# Patient Record
Sex: MALE | Race: WHITE | Employment: FULL TIME | URBAN - METROPOLITAN AREA
[De-identification: names, ages, dates, MRNs, and addresses within clinical notes are randomized per-mention and may not be internally consistent; named-entity substitution may affect disease eponyms.]

---

## 2017-03-27 ENCOUNTER — OFFICE VISIT (OUTPATIENT)
Dept: INTERNAL MEDICINE | Age: 27
End: 2017-03-27
Payer: COMMERCIAL

## 2017-03-27 VITALS
RESPIRATION RATE: 13 BRPM | WEIGHT: 291.2 LBS | OXYGEN SATURATION: 100 % | BODY MASS INDEX: 41.69 KG/M2 | HEART RATE: 77 BPM | SYSTOLIC BLOOD PRESSURE: 128 MMHG | DIASTOLIC BLOOD PRESSURE: 85 MMHG | HEIGHT: 70 IN

## 2017-03-27 DIAGNOSIS — J45.20 MILD INTERMITTENT ASTHMA WITHOUT COMPLICATION: ICD-10-CM

## 2017-03-27 DIAGNOSIS — I10 BENIGN ESSENTIAL HTN: Primary | ICD-10-CM

## 2017-03-27 PROCEDURE — 99214 OFFICE O/P EST MOD 30 MIN: CPT | Performed by: INTERNAL MEDICINE

## 2017-03-27 RX ORDER — LOSARTAN POTASSIUM AND HYDROCHLOROTHIAZIDE 25; 100 MG/1; MG/1
1 TABLET ORAL DAILY
Qty: 90 TABLET | Refills: 3 | Status: SHIPPED | OUTPATIENT
Start: 2017-03-27 | End: 2017-09-27 | Stop reason: SDUPTHER

## 2017-03-27 RX ORDER — ALBUTEROL SULFATE 90 UG/1
2 AEROSOL, METERED RESPIRATORY (INHALATION) EVERY 6 HOURS PRN
Qty: 1 INHALER | Refills: 3 | Status: SHIPPED | OUTPATIENT
Start: 2017-03-27

## 2017-03-27 ASSESSMENT — PATIENT HEALTH QUESTIONNAIRE - PHQ9
SUM OF ALL RESPONSES TO PHQ9 QUESTIONS 1 & 2: 0
SUM OF ALL RESPONSES TO PHQ QUESTIONS 1-9: 0
2. FEELING DOWN, DEPRESSED OR HOPELESS: 0
1. LITTLE INTEREST OR PLEASURE IN DOING THINGS: 0

## 2017-03-27 ASSESSMENT — ENCOUNTER SYMPTOMS: DIFFICULTY BREATHING: 1

## 2017-09-27 ENCOUNTER — OFFICE VISIT (OUTPATIENT)
Dept: INTERNAL MEDICINE | Age: 27
End: 2017-09-27
Payer: COMMERCIAL

## 2017-09-27 VITALS
SYSTOLIC BLOOD PRESSURE: 142 MMHG | DIASTOLIC BLOOD PRESSURE: 93 MMHG | HEART RATE: 110 BPM | OXYGEN SATURATION: 100 % | HEIGHT: 70 IN | RESPIRATION RATE: 12 BRPM | WEIGHT: 284 LBS | BODY MASS INDEX: 40.66 KG/M2

## 2017-09-27 DIAGNOSIS — J45.20 MILD INTERMITTENT ASTHMA WITHOUT COMPLICATION: ICD-10-CM

## 2017-09-27 DIAGNOSIS — I10 BENIGN ESSENTIAL HTN: Primary | ICD-10-CM

## 2017-09-27 DIAGNOSIS — Z00.00 WELL ADULT EXAM: ICD-10-CM

## 2017-09-27 PROCEDURE — 99214 OFFICE O/P EST MOD 30 MIN: CPT | Performed by: INTERNAL MEDICINE

## 2017-09-27 RX ORDER — LOSARTAN POTASSIUM AND HYDROCHLOROTHIAZIDE 25; 100 MG/1; MG/1
1 TABLET ORAL DAILY
Qty: 90 TABLET | Refills: 3 | Status: SHIPPED | OUTPATIENT
Start: 2017-09-27 | End: 2018-04-12 | Stop reason: SDUPTHER

## 2017-09-27 ASSESSMENT — ENCOUNTER SYMPTOMS
EYES NEGATIVE: 1
GASTROINTESTINAL NEGATIVE: 1
RESPIRATORY NEGATIVE: 1
DIFFICULTY BREATHING: 1

## 2018-04-12 ENCOUNTER — OFFICE VISIT (OUTPATIENT)
Dept: INTERNAL MEDICINE | Age: 28
End: 2018-04-12
Payer: COMMERCIAL

## 2018-04-12 VITALS
SYSTOLIC BLOOD PRESSURE: 132 MMHG | HEIGHT: 70 IN | DIASTOLIC BLOOD PRESSURE: 86 MMHG | OXYGEN SATURATION: 100 % | RESPIRATION RATE: 12 BRPM | BODY MASS INDEX: 39.11 KG/M2 | WEIGHT: 273.2 LBS | HEART RATE: 100 BPM

## 2018-04-12 DIAGNOSIS — I10 BENIGN ESSENTIAL HTN: Primary | ICD-10-CM

## 2018-04-12 DIAGNOSIS — G44.209 TENSION HEADACHE: ICD-10-CM

## 2018-04-12 PROCEDURE — 99214 OFFICE O/P EST MOD 30 MIN: CPT | Performed by: INTERNAL MEDICINE

## 2018-04-12 RX ORDER — LOSARTAN POTASSIUM AND HYDROCHLOROTHIAZIDE 25; 100 MG/1; MG/1
1 TABLET ORAL DAILY
Qty: 30 TABLET | Refills: 11 | Status: SHIPPED | OUTPATIENT
Start: 2018-04-12 | End: 2018-10-12 | Stop reason: SDUPTHER

## 2018-04-12 ASSESSMENT — PATIENT HEALTH QUESTIONNAIRE - PHQ9
SUM OF ALL RESPONSES TO PHQ9 QUESTIONS 1 & 2: 0
2. FEELING DOWN, DEPRESSED OR HOPELESS: 0
SUM OF ALL RESPONSES TO PHQ QUESTIONS 1-9: 0
1. LITTLE INTEREST OR PLEASURE IN DOING THINGS: 0

## 2018-04-12 ASSESSMENT — ENCOUNTER SYMPTOMS
RESPIRATORY NEGATIVE: 1
GASTROINTESTINAL NEGATIVE: 1
EYES NEGATIVE: 1

## 2018-10-12 ENCOUNTER — OFFICE VISIT (OUTPATIENT)
Dept: INTERNAL MEDICINE | Age: 28
End: 2018-10-12
Payer: COMMERCIAL

## 2018-10-12 VITALS
WEIGHT: 260 LBS | BODY MASS INDEX: 37.31 KG/M2 | DIASTOLIC BLOOD PRESSURE: 85 MMHG | OXYGEN SATURATION: 98 % | SYSTOLIC BLOOD PRESSURE: 137 MMHG | HEART RATE: 93 BPM

## 2018-10-12 DIAGNOSIS — J45.20 MILD INTERMITTENT ASTHMA WITHOUT COMPLICATION: ICD-10-CM

## 2018-10-12 DIAGNOSIS — Z00.00 WELL ADULT EXAM: ICD-10-CM

## 2018-10-12 DIAGNOSIS — I10 BENIGN ESSENTIAL HTN: Primary | ICD-10-CM

## 2018-10-12 PROCEDURE — 99214 OFFICE O/P EST MOD 30 MIN: CPT | Performed by: INTERNAL MEDICINE

## 2018-10-12 RX ORDER — LOSARTAN POTASSIUM AND HYDROCHLOROTHIAZIDE 25; 100 MG/1; MG/1
1 TABLET ORAL DAILY
Qty: 90 TABLET | Refills: 3 | Status: SHIPPED | OUTPATIENT
Start: 2018-10-12 | End: 2019-08-22 | Stop reason: SDUPTHER

## 2018-10-12 ASSESSMENT — ENCOUNTER SYMPTOMS
GASTROINTESTINAL NEGATIVE: 1
ALLERGIC/IMMUNOLOGIC NEGATIVE: 1
COUGH: 1

## 2019-04-15 ENCOUNTER — OFFICE VISIT (OUTPATIENT)
Dept: PRIMARY CARE CLINIC | Age: 29
End: 2019-04-15
Payer: COMMERCIAL

## 2019-04-15 VITALS
TEMPERATURE: 98.3 F | HEIGHT: 70 IN | WEIGHT: 273 LBS | OXYGEN SATURATION: 99 % | DIASTOLIC BLOOD PRESSURE: 76 MMHG | BODY MASS INDEX: 39.08 KG/M2 | SYSTOLIC BLOOD PRESSURE: 130 MMHG | HEART RATE: 76 BPM | RESPIRATION RATE: 17 BRPM

## 2019-04-15 DIAGNOSIS — I10 ESSENTIAL HYPERTENSION: Primary | ICD-10-CM

## 2019-04-15 PROCEDURE — 99214 OFFICE O/P EST MOD 30 MIN: CPT | Performed by: INTERNAL MEDICINE

## 2019-04-15 ASSESSMENT — PATIENT HEALTH QUESTIONNAIRE - PHQ9
SUM OF ALL RESPONSES TO PHQ QUESTIONS 1-9: 0
1. LITTLE INTEREST OR PLEASURE IN DOING THINGS: 0
2. FEELING DOWN, DEPRESSED OR HOPELESS: 0
SUM OF ALL RESPONSES TO PHQ QUESTIONS 1-9: 0
SUM OF ALL RESPONSES TO PHQ9 QUESTIONS 1 & 2: 0

## 2019-04-15 NOTE — PROGRESS NOTES
Visit Information    Have you changed or started any medications since your last visit including any over-the-counter medicines, vitamins, or herbal medicines? no   Have you stopped taking any of your medications? Is so, why? -  no  Are you having any side effects from any of your medications? - no    Have you seen any other physician or provider since your last visit?  no   Have you had any other diagnostic tests since your last visit?  no   Have you been seen in the emergency room and/or had an admission in a hospital since we last saw you?  no   Have you had your routine dental cleaning in the past 6 months?  no     Do you have an active MyChart account? If no, what is the barrier?   No    Patient Care Team:  Merline Mound, MD as PCP - General (Internal Medicine)  Merline Mound, MD as PCP - [facility] Attributed Provider    Medical History Review  Past Medical, Family, and Social History reviewed and does not contribute to the patient presenting condition    Health Maintenance   Topic Date Due    Pneumococcal 0-64 years Vaccine (1 of 1 - PPSV23) 02/25/1995    Varicella Vaccine (1 of 2 - 13+ 2-dose series) 02/25/2002    DTaP/Tdap/Td vaccine (1 - Tdap) 02/25/2008    Potassium monitoring  02/13/2017    Creatinine monitoring  02/13/2017    Flu vaccine (Season Ended) 09/01/2019    HIV screen  Addressed
There are no known risk factors for coronary artery disease. Past treatments include angiotensin blockers and diuretics. The current treatment provides significant improvement. There are no compliance problems. Has not used inhaler in a couple years. ROS:     Review of Systems   All other systems reviewed and are negative. Objective:     Physical Exam   Constitutional: He is oriented to person, place, and time. He appears well-developed and well-nourished. HENT:   Head: Normocephalic and atraumatic. Neck: Neck supple. Cardiovascular: Normal rate and regular rhythm. Pulmonary/Chest: Effort normal and breath sounds normal.   Abdominal: Soft. Musculoskeletal: He exhibits no edema. Neurological: He is alert and oriented to person, place, and time. Skin: Skin is warm and dry. Psychiatric: He has a normal mood and affect.  His behavior is normal.

## 2019-08-22 ENCOUNTER — OFFICE VISIT (OUTPATIENT)
Dept: FAMILY MEDICINE CLINIC | Age: 29
End: 2019-08-22
Payer: COMMERCIAL

## 2019-08-22 VITALS
WEIGHT: 270 LBS | OXYGEN SATURATION: 100 % | SYSTOLIC BLOOD PRESSURE: 128 MMHG | HEIGHT: 71 IN | BODY MASS INDEX: 37.8 KG/M2 | HEART RATE: 96 BPM | DIASTOLIC BLOOD PRESSURE: 72 MMHG

## 2019-08-22 DIAGNOSIS — Z71.82 EXERCISE COUNSELING: ICD-10-CM

## 2019-08-22 DIAGNOSIS — Z00.00 WELL ADULT EXAM: Primary | ICD-10-CM

## 2019-08-22 DIAGNOSIS — Z13.89 MULTIPHASIC SCREENING: ICD-10-CM

## 2019-08-22 DIAGNOSIS — J45.20 MILD INTERMITTENT ASTHMA WITHOUT COMPLICATION: ICD-10-CM

## 2019-08-22 DIAGNOSIS — I10 HTN, GOAL BELOW 130/80: ICD-10-CM

## 2019-08-22 DIAGNOSIS — G47.19 EXCESSIVE DAYTIME SLEEPINESS: ICD-10-CM

## 2019-08-22 DIAGNOSIS — Z71.3 DIETARY COUNSELING: ICD-10-CM

## 2019-08-22 DIAGNOSIS — Z23 NEED FOR VACCINATION: ICD-10-CM

## 2019-08-22 DIAGNOSIS — K21.9 GASTROESOPHAGEAL REFLUX DISEASE WITHOUT ESOPHAGITIS: ICD-10-CM

## 2019-08-22 PROCEDURE — 99385 PREV VISIT NEW AGE 18-39: CPT | Performed by: FAMILY MEDICINE

## 2019-08-22 PROCEDURE — G8417 CALC BMI ABV UP PARAM F/U: HCPCS | Performed by: FAMILY MEDICINE

## 2019-08-22 PROCEDURE — 90471 IMMUNIZATION ADMIN: CPT | Performed by: FAMILY MEDICINE

## 2019-08-22 PROCEDURE — 99203 OFFICE O/P NEW LOW 30 MIN: CPT | Performed by: FAMILY MEDICINE

## 2019-08-22 PROCEDURE — 90715 TDAP VACCINE 7 YRS/> IM: CPT | Performed by: FAMILY MEDICINE

## 2019-08-22 RX ORDER — RANITIDINE 150 MG/1
150 TABLET ORAL 2 TIMES DAILY
Qty: 60 TABLET | Refills: 3 | Status: SHIPPED | OUTPATIENT
Start: 2019-08-22 | End: 2020-09-10

## 2019-08-22 RX ORDER — LOSARTAN POTASSIUM AND HYDROCHLOROTHIAZIDE 25; 100 MG/1; MG/1
1 TABLET ORAL DAILY
Qty: 90 TABLET | Refills: 1 | Status: SHIPPED | OUTPATIENT
Start: 2019-08-22 | End: 2019-10-22 | Stop reason: SDUPTHER

## 2019-08-22 NOTE — PATIENT INSTRUCTIONS
active, even a little bit every day. Making small changes over time can add up to a lot. Make a plan for change. Many people have found that naming their reasons for change and staying focused on their plan can make a big difference. Work with your doctor to create a plan that is right for you. · Ask yourself: Jad Gray are my personal, most powerful reasons for wanting this change? What will my life look like when I've made the change? \"  · Set your long-term goal. Make it specific, such as \"I will lose x pounds. \"  · Break your long-term goal into smaller, short-term goals. Make these small steps specific and within your reach, things you know you can do. These steps are what keep you going from day to day. Talk with your doctor about other weight-loss options. If you have a BMI in a certain range and have not been able to lose weight with diet and exercise, medicine or surgery may be an option for you. Before your doctor will prescribe medicines or surgery, he or she will probably want you to be more active and follow your healthy eating plan for a period of time. These habits are key lifelong changes for managing your weight, with or without other medical treatment. And these changes can help you avoid weight-related health problems. How can you stay on your plan for change? Be ready. Choose to start during a time when there are few events that might trigger slip-ups, like holidays, social events, and high-stress periods. Decide on your first few steps. Most people have more success when they make small changes, one step at a time. For example, you might switch a daily candy bar to a piece of fruit, walk 10 minutes more, or add more vegetables to a meal.  Line up your support people. Make sure you're not going to be alone as you make this change. Connect with people who understand how important it is to you. Ask family members and friends for help in keeping with your plan.  And think about who could make it

## 2019-08-22 NOTE — PROGRESS NOTES
+EHSAN  Genitourinary: No change in urinary frequency or dysuria. Skin: No rashes or skin lesions. Neurological: No weakness. No headaches. Last Filed Vitals:  /72 (Site: Left Upper Arm, Position: Sitting, Cuff Size: Large Adult)   Pulse 96   Ht 5' 11\" (1.803 m)   Wt 270 lb (122.5 kg)   SpO2 100%   BMI 37.66 kg/m²      Physical Examination:     GENERAL APPEARANCE: in no acute distress, well developed, well nourished. HEAD: normocephalic, atraumatic. EYES: extraocular movement intact (EOMI), pupils equal, round, reactive to light and accommodation. EARS: normal, tympanic membrane intact, clear, auditory canal clear. NOSE: nares patent, no erythema, sinuses nontender bilaterally, no rhinorrhea. ORAL CAVITY: mucosa moist, no lesions. THROAT: clear, no mass, no exudate. NECK/THYROID: neck supple, full range of motion, no thyromegaly. HEART: no murmurs, regular rate and rhythm, S1, S2 normal.   LUNGS: clear to auscultation bilaterally, no wheezes, rales, rhonchi.    ABDOMEN: normal, bowel sounds present, soft, nontender, nondistended, no rebound guarding or rigidity    Recent Labs/ In Office Testing/ Radiograph review:     Hospital Outpatient Visit on 02/13/2016   Component Date Value Ref Range Status    Glucose 02/13/2016 91  70 - 99 mg/dL Final    BUN 02/13/2016 13  6 - 20 mg/dL Final    CREATININE 02/13/2016 0.80  0.70 - 1.20 mg/dL Final    Bun/Cre Ratio 02/13/2016 NOT REPORTED  9 - 20 Final    Calcium 02/13/2016 9.2  8.6 - 10.4 mg/dL Final    Sodium 02/13/2016 141  135 - 144 mmol/L Final    Potassium 02/13/2016 4.1  3.7 - 5.3 mmol/L Final    Chloride 02/13/2016 101  98 - 107 mmol/L Final    CO2 02/13/2016 25  20 - 31 mmol/L Final    Anion Gap 02/13/2016 15  9 - 17 mmol/L Final    GFR Non- 02/13/2016 >60  >60 mL/min Final    GFR  02/13/2016 >60  >60 mL/min Final    GFR Comment 02/13/2016        Final    Comment: Average GFR for 20-29 years The patient was evaluated and treated today based on the osteopathic principle that each person is a unit of body, mind, and spirit, the body is capable of self-regulation, self-healing, and health maintenance and that structure and function are reciprocally interrelated. Follow-up:   Return in about 6 months (around 2/22/2020) for htn/gerd 20 min appt. Teresa Menezes D.O.    BMI was elevated today, and weight loss plan recommended is : conventional weight loss.

## 2019-11-25 DIAGNOSIS — I10 HTN, GOAL BELOW 130/80: ICD-10-CM

## 2019-11-25 RX ORDER — LOSARTAN POTASSIUM AND HYDROCHLOROTHIAZIDE 25; 100 MG/1; MG/1
1 TABLET ORAL DAILY
Qty: 30 TABLET | Refills: 2 | Status: SHIPPED | OUTPATIENT
Start: 2019-11-25 | End: 2019-11-25

## 2019-11-25 RX ORDER — LOSARTAN POTASSIUM 100 MG/1
100 TABLET ORAL DAILY
Qty: 30 TABLET | Refills: 2 | Status: SHIPPED | OUTPATIENT
Start: 2019-11-25 | End: 2020-01-21

## 2019-11-25 RX ORDER — HYDROCHLOROTHIAZIDE 25 MG/1
25 TABLET ORAL EVERY MORNING
Qty: 30 TABLET | Refills: 2 | Status: SHIPPED | OUTPATIENT
Start: 2019-11-25 | End: 2020-01-21

## 2020-01-21 RX ORDER — HYDROCHLOROTHIAZIDE 25 MG/1
TABLET ORAL
Qty: 30 TABLET | Refills: 3 | Status: SHIPPED | OUTPATIENT
Start: 2020-01-21 | End: 2020-06-25 | Stop reason: SDUPTHER

## 2020-01-21 RX ORDER — LOSARTAN POTASSIUM 100 MG/1
TABLET ORAL
Qty: 30 TABLET | Refills: 3 | Status: SHIPPED | OUTPATIENT
Start: 2020-01-21 | End: 2020-06-25 | Stop reason: SDUPTHER

## 2020-01-21 NOTE — TELEPHONE ENCOUNTER
Last visit: 8/22/19  Last Med refill:   Does patient have enough medication for 72 hours:     Next Visit Date:  Future Appointments   Date Time Provider Emerald Ferrell   2/24/2020  7:00 AM Ric Castillo  [address] [facility] Maintenance   Topic Date Due    Varicella Vaccine (1 of 2 - 2-dose childhood series) 02/25/1990    Pneumococcal 0-64 years Vaccine (1 of 1 - PPSV23) 02/25/1995    Potassium monitoring  02/13/2017    Creatinine monitoring  02/13/2017    Flu vaccine (1) 09/01/2019    DTaP/Tdap/Td vaccine (2 - Td) 08/22/2029    HIV screen  Addressed       No results found for: LABA1C          ( goal A1C is < 7)   No results found for: LABMICR  LDL Cholesterol (mg/dL)   Date Value   02/13/2016 143 (H)       (goal LDL is <100)   BUN (mg/dL)   Date Value   02/13/2016 13     BP Readings from Last 3 Encounters:   08/22/19 128/72   04/15/19 130/76   10/12/18 137/85          (goal 120/80)    All Future Testing planned in [facility]  Lab Frequency Next Occurrence   ALT Once 08/22/2019   AST Once 08/22/2019   CBC Auto Differential Once 08/22/2019   Hemoglobin A1C Once 08/22/2019   HIV Screen Once 08/22/2019   Lipid Panel Once 08/22/2019   Magnesium Once 08/22/2019   Renal Function Panel Once 08/22/2019   TSH with Reflex Once 08/22/2019   Vitamin D 25 Hydroxy Once 08/22/2019               Patient Active Problem List:     Elevated blood pressure reading     Benign essential HTN     Mild intermittent asthma without complication     Tension headache

## 2020-03-06 ENCOUNTER — OFFICE VISIT (OUTPATIENT)
Dept: FAMILY MEDICINE CLINIC | Age: 30
End: 2020-03-06
Payer: COMMERCIAL

## 2020-03-06 VITALS
HEART RATE: 94 BPM | SYSTOLIC BLOOD PRESSURE: 122 MMHG | BODY MASS INDEX: 38.49 KG/M2 | OXYGEN SATURATION: 99 % | WEIGHT: 276 LBS | DIASTOLIC BLOOD PRESSURE: 78 MMHG

## 2020-03-06 PROCEDURE — G8417 CALC BMI ABV UP PARAM F/U: HCPCS | Performed by: FAMILY MEDICINE

## 2020-03-06 PROCEDURE — 99214 OFFICE O/P EST MOD 30 MIN: CPT | Performed by: FAMILY MEDICINE

## 2020-03-06 ASSESSMENT — PATIENT HEALTH QUESTIONNAIRE - PHQ9
1. LITTLE INTEREST OR PLEASURE IN DOING THINGS: 0
SUM OF ALL RESPONSES TO PHQ9 QUESTIONS 1 & 2: 0
2. FEELING DOWN, DEPRESSED OR HOPELESS: 0
SUM OF ALL RESPONSES TO PHQ QUESTIONS 1-9: 0
SUM OF ALL RESPONSES TO PHQ QUESTIONS 1-9: 0

## 2020-03-06 NOTE — PATIENT INSTRUCTIONS
Learning About Obesity  What is obesity? Obesity means having a body mass index (BMI) of 30 or above. BMI is a number that is calculated from your weight and your height. How do you know if your weight is in the obesity range? To know if your weight is in the obesity range, your doctor looks at your body mass index (BMI) and waist size. BMI is a number that is calculated from your weight and your height. To figure out your BMI for yourself, you can use an online tool, such as http://www.cai.com/ on the [employer]. If your BMI is 30.0 or higher, it falls within the obesity range. Keep in mind that BMI and waist size are only guides. They are not tools to determine your ideal body weight. What causes obesity? When you take in more calories than you burn off, you gain weight. How you eat, how active you are, and other things affect how your body uses calories and whether you gain weight. If you have family members who have too much body fat, you may have inherited a tendency to gain weight. And your family also helps form your eating and lifestyle habits, which can lead to obesity. Also, our busy lives make it harder to plan and cook healthy meals. For many of us, it's easier to reach for prepared foods, go out to eat, or go to the drive-through. But these foods are often high in saturated fat and calories. Portions are often too large. What can you do to reach a healthy weight? Focus on health, not diets. Diets are hard to stay on and don't work in the long run. It is very hard to stay with a diet that includes lots of big changes in your eating habits. Instead of a diet, focus on lifestyle changes that will improve your health and achieve the right balance of energy and calories. To lose weight, you need to burn more calories than you take in.  You can do it by eating healthy foods in reasonable amounts and becoming more active, even a little bit every day. Making small changes over time can add up to a lot. Make a plan for change. Many people have found that naming their reasons for change and staying focused on their plan can make a big difference. Work with your doctor to create a plan that is right for you. · Ask yourself: Gabriela Dee are my personal, most powerful reasons for wanting this change? What will my life look like when I've made the change? \"  · Set your long-term goal. Make it specific, such as \"I will lose x pounds. \"  · Break your long-term goal into smaller, short-term goals. Make these small steps specific and within your reach, things you know you can do. These steps are what keep you going from day to day. Talk with your doctor about other weight-loss options. If you have a BMI in a certain range and have not been able to lose weight with diet and exercise, medicine or surgery may be an option for you. Before your doctor will prescribe medicines or surgery, he or she will probably want you to be more active and follow your healthy eating plan for a period of time. These habits are key lifelong changes for managing your weight, with or without other medical treatment. And these changes can help you avoid weight-related health problems. How can you stay on your plan for change? Be ready. Choose to start during a time when there are few events that might trigger slip-ups, like holidays, social events, and high-stress periods. Decide on your first few steps. Most people have more success when they make small changes, one step at a time. For example, you might switch a daily candy bar to a piece of fruit, walk 10 minutes more, or add more vegetables to a meal.  Line up your support people. Make sure you're not going to be alone as you make this change. Connect with people who understand how important it is to you. Ask family members and friends for help in keeping with your plan.  And think about who could make it harder for you, and how to handle them. Try tracking. People who keep track of what they eat, feel, and do are better at losing weight. Try writing down things like:  · What and how much you eat. · How you feel before and after each meal.  · Details about each meal (like eating out or at home, eating alone, or with friends or family). · What you do to be active. Look and plan. As you track, look for patterns that you may want to change. Take note of:  · When you eat and whether you skip meals. · How often you eat out. · How many fruits and vegetables you eat. · When you eat beyond feeling full. · When and why you eat for reasons other than being hungry. When you stray from your plan, don't get upset. Figure out what made you slip up and how you can fix it. Can you take medicines or have surgery to lose weight? If you have a BMI in a certain range and have not been able to lose weight with diet and exercise, medicine or surgery may be an option for you. If you have a BMI of at least 30.0 (or a BMI of at least 27.0 and another health problem related to your weight), ask your doctor about weight-loss medicines. They work by making you feel less hungry, making you feel full more quickly, or changing how you digest fat. Medicines are used along with diet changes and more physical activity to help you make lasting changes. If you have a BMI of 40.0 or more (or a BMI of 35.0 or more and another health problem related to your weight), your doctor may talk with you about surgery. Weight-loss surgery has risks, and you will need to work with your doctor to compare the risk of having obesity with the risks of surgery. With any option you choose, you will still need to eat a healthy diet and get regular exercise. Follow-up care is a key part of your treatment and safety. Be sure to make and go to all appointments, and call your doctor if you are having problems.  It's also a good idea to know your test results and

## 2020-03-06 NOTE — PROGRESS NOTES
Progress Note    Josh Cadena is a 31 y.o.  male who presents today alone for evaluation of   Chief Complaint   Patient presents with    Hypertension    Hyperlipidemia    Hyperglycemia    Gastroesophageal Reflux           HPI:   Patient is here for follow up on HTN, GERD, dyslipidemia, and hyperglycemia. Today, patient denies cp/sob/le edema/dizziness/lightheadedness/blurry va/ha. Patient denies excessive OTC stimulants/sodium. Patient denies specific diet. Patient denies regular aerobic activity. Patient denies abd pain/v/n/d. Patient denies blood in stool. Patient denies dark tarry stools. Patient denies wt loss or night sweats. Patient denies recent flare of GERD. Patient uses zantac prn. Health Maintenance Due   Topic Date Due    Varicella vaccine (1 of 2 - 2-dose childhood series) 02/25/1990    Pneumococcal 0-64 years Vaccine (1 of 1 - PPSV23) 02/25/1995    Potassium monitoring  02/13/2017    Creatinine monitoring  02/13/2017    Flu vaccine (1) 09/01/2019        Current Medications:     Current Outpatient Medications   Medication Sig Dispense Refill    losartan (COZAAR) 100 MG tablet TAKE 1 TABLET BY MOUTH ONE TIME A DAY  30 tablet 3    hydrochlorothiazide (HYDRODIURIL) 25 MG tablet TAKE 1 TABLET BY MOUTH IN THE MORNING  30 tablet 3    ranitidine (ZANTAC) 150 MG tablet Take 1 tablet by mouth 2 times daily 60 tablet 3    albuterol sulfate HFA (PROAIR HFA) 108 (90 BASE) MCG/ACT inhaler Inhale 2 puffs into the lungs every 6 hours as needed for Wheezing 1 Inhaler 3     No current facility-administered medications for this visit.         Allergies:   No Known Allergies     Medical History:     Past Medical History:   Diagnosis Date    Hypertension     Mild intermittent asthma without complication 5/68/4849    Tension headache 4/12/2018       Past Surgical History:   Procedure Laterality Date    TONSILLECTOMY AND ADENOIDECTOMY      WISDOM TOOTH EXTRACTION         Family History Problem Relation Age of Onset    Hypertension Father     Heart Disease Father     Hypertension Maternal Grandmother         Social History:     Social History     Socioeconomic History    Marital status:      Spouse name: Not on file    Number of children: Not on file    Years of education: Not on file    Highest education level: Not on file   Occupational History    Not on file   Social Needs    Financial resource strain: Not on file    Food insecurity:     Worry: Not on file     Inability: Not on file    Transportation needs:     Medical: Not on file     Non-medical: Not on file   Tobacco Use    Smoking status: Never Smoker    Smokeless tobacco: Never Used   Substance and Sexual Activity    Alcohol use: Yes     Alcohol/week: 0.0 standard drinks     Comment: rare    Drug use: No    Sexual activity: Yes     Partners: Female   Lifestyle    Physical activity:     Days per week: Not on file     Minutes per session: Not on file    Stress: Not on file   Relationships    Social connections:     Talks on phone: Not on file     Gets together: Not on file     Attends Sikhism service: Not on file     Active member of club or organization: Not on file     Attends meetings of clubs or organizations: Not on file     Relationship status: Not on file    Intimate partner violence:     Fear of current or ex partner: Not on file     Emotionally abused: Not on file     Physically abused: Not on file     Forced sexual activity: Not on file   Other Topics Concern    Not on file   Social History Narrative    Not on file        ROS:     Constitutional: No fevers, chills, fatigue. ENT: No nasal congestion or sore throat  Respiratory: No difficulty in breathing or cough. Cardiovascular: No chest pain, palpitations or shortness of breath  Gastrointestinal: No abdominal pain or change in bowel movements. Genitourinary: No change in urinary frequency or dysuria.   Skin: No rashes or skin ([phone]      GFR Staging 02/13/2016 NOT REPORTED   Final    WBC 02/13/2016 7.7  3.5 - 11.0 k/uL Final    RBC 02/13/2016 5.45  4.5 - 5.9 m/uL Final    Hemoglobin 02/13/2016 14.4  13.5 - 17.5 g/dL Final    Hematocrit 02/13/2016 44.0  41 - 53 % Final    MCV 02/13/2016 80.8  80 - 100 fL Final    MCH 02/13/2016 26.5  26 - 34 pg Final    MCHC 02/13/2016 32.8  31 - 37 g/dL Final    RDW 02/13/2016 14.3  12.5 - 15.4 % Final    Platelets 88/76/1137 322  140 - 450 k/uL Final    MPV 02/13/2016 7.9  6.0 - 12.0 fL Final    [facility] [id number] [place] [address], [address] [place] [address] [phone]    Cholesterol 02/13/2016 213* <200 mg/dL Final    Comment:    Cholesterol Guidelines:      <200  Desirable   200-240  Borderline      >240  Undesirable         HDL 02/13/2016 41  >40 mg/dL Final    Comment:    HDL Guidelines:    <40     Undesirable   40-59    Borderline    >59     Desirable         LDL Cholesterol 02/13/2016 143* 0 - 130 mg/dL Final    Comment:    LDL Guidelines:     <100    Desirable   100-129   Near to/above Desirable   130-159   Borderline      >159   Undesirable     Direct (measured) LDL and calculated LDL are not interchangeable tests.  Chol/HDL Ratio 02/13/2016 5.2* <5 Final            Triglycerides 02/13/2016 144  <150 mg/dL Final    Comment:    Triglyceride Guidelines:     <150   Desirable   150-199  Borderline   200-499  High     >499   Very high   Based on AHA Guidelines for fasting triglyceride, October 2012.      [facility] [address] [facility], [address] [place] [address] [phone]      VLDL 02/13/2016 NOT REPORTED  1 - 30 mg/dL Final    Color, UA 02/13/2016 YELLOW  YEL Final    Turbidity UA 02/13/2016 CLEAR  CLEAR Final    Glucose, Ur 02/13/2016 NEGATIVE  NEG Final    Bilirubin Urine 02/13/2016 NEGATIVE  NEG Final    Ketones, Urine 02/13/2016 NEGATIVE  NEG Final    Specific Gravity, UA 02/13/2016 1.020  1.005 - 1.030 Final    Urine Hgb 02/13/2016 NEGATIVE  NEG Final    pH, UA 02/13/2016

## 2020-06-19 ENCOUNTER — HOSPITAL ENCOUNTER (OUTPATIENT)
Age: 30
Discharge: HOME | End: 2020-06-19
Payer: COMMERCIAL

## 2020-06-19 LAB
ABSOLUTE EOS #: 0.13 K/UL (ref 0–0.44)
ABSOLUTE IMMATURE GRANULOCYTE: <0.03 K/UL (ref 0–0.3)
ABSOLUTE LYMPH #: 2.3 K/UL (ref 1.1–3.7)
ABSOLUTE MONO #: 0.38 K/UL (ref 0.1–1.2)
ALBUMIN SERPL-MCNC: 4.5 G/DL (ref 3.5–5.2)
ALT SERPL-CCNC: 28 U/L (ref 5–41)
ANION GAP SERPL CALCULATED.3IONS-SCNC: 13 MMOL/L (ref 9–17)
AST SERPL-CCNC: 15 U/L
BASOPHILS # BLD: 1 % (ref 0–2)
BASOPHILS ABSOLUTE: 0.04 K/UL (ref 0–0.2)
BUN BLDV-MCNC: 11 MG/DL (ref 6–20)
BUN/CREAT BLD: ABNORMAL (ref 9–20)
CALCIUM SERPL-MCNC: 9.7 MG/DL (ref 8.6–10.4)
CHLORIDE BLD-SCNC: 100 MMOL/L (ref 98–107)
CHOLESTEROL/HDL RATIO: 4
CHOLESTEROL: 178 MG/DL
CO2: 27 MMOL/L (ref 20–31)
CREAT SERPL-MCNC: 0.8 MG/DL (ref 0.7–1.2)
DIFFERENTIAL TYPE: NORMAL
EOSINOPHILS RELATIVE PERCENT: 2 % (ref 1–4)
ESTIMATED AVERAGE GLUCOSE: 111 MG/DL
GFR AFRICAN AMERICAN: >60 ML/MIN
GFR NON-AFRICAN AMERICAN: >60 ML/MIN
GFR SERPL CREATININE-BSD FRML MDRD: ABNORMAL ML/MIN/{1.73_M2}
GFR SERPL CREATININE-BSD FRML MDRD: ABNORMAL ML/MIN/{1.73_M2}
GLUCOSE BLD-MCNC: 88 MG/DL (ref 70–99)
HBA1C MFR BLD: 5.5 % (ref 4–6)
HCT VFR BLD CALC: 44.2 % (ref 40.7–50.3)
HDLC SERPL-MCNC: 45 MG/DL
HEMOGLOBIN: 13.8 G/DL (ref 13–17)
HIV AG/AB: NONREACTIVE
IMMATURE GRANULOCYTES: 0 %
LDL CHOLESTEROL: 111 MG/DL (ref 0–130)
LYMPHOCYTES # BLD: 33 % (ref 24–43)
MAGNESIUM: 2.1 MG/DL (ref 1.6–2.6)
MCH RBC QN AUTO: 26.5 PG (ref 25.2–33.5)
MCHC RBC AUTO-ENTMCNC: 31.2 G/DL (ref 28.4–34.8)
MCV RBC AUTO: 84.8 FL (ref 82.6–102.9)
MONOCYTES # BLD: 5 % (ref 3–12)
NRBC AUTOMATED: 0 PER 100 WBC
PDW BLD-RTO: 13.9 % (ref 11.8–14.4)
PHOSPHORUS: 3 MG/DL (ref 2.5–4.5)
PLATELET # BLD: 325 K/UL (ref 138–453)
PLATELET ESTIMATE: NORMAL
PMV BLD AUTO: 10.4 FL (ref 8.1–13.5)
POTASSIUM SERPL-SCNC: 3.6 MMOL/L (ref 3.7–5.3)
RBC # BLD: 5.21 M/UL (ref 4.21–5.77)
RBC # BLD: NORMAL 10*6/UL
SEG NEUTROPHILS: 59 % (ref 36–65)
SEGMENTED NEUTROPHILS ABSOLUTE COUNT: 4.13 K/UL (ref 1.5–8.1)
SODIUM BLD-SCNC: 140 MMOL/L (ref 135–144)
TRIGL SERPL-MCNC: 112 MG/DL
TSH SERPL DL<=0.05 MIU/L-ACNC: 0.7 MIU/L (ref 0.3–5)
VLDLC SERPL CALC-MCNC: NORMAL MG/DL (ref 1–30)
WBC # BLD: 7 K/UL (ref 3.5–11.3)
WBC # BLD: NORMAL 10*3/UL

## 2020-06-19 PROCEDURE — 83735 ASSAY OF MAGNESIUM: CPT

## 2020-06-19 PROCEDURE — 84450 TRANSFERASE (AST) (SGOT): CPT

## 2020-06-19 PROCEDURE — 87389 HIV-1 AG W/HIV-1&-2 AB AG IA: CPT

## 2020-06-19 PROCEDURE — 84460 ALANINE AMINO (ALT) (SGPT): CPT

## 2020-06-19 PROCEDURE — 84443 ASSAY THYROID STIM HORMONE: CPT

## 2020-06-19 PROCEDURE — 80061 LIPID PANEL: CPT

## 2020-06-19 PROCEDURE — 85025 COMPLETE CBC W/AUTO DIFF WBC: CPT

## 2020-06-19 PROCEDURE — 83013 H PYLORI (C-13) BREATH: CPT

## 2020-06-19 PROCEDURE — 80069 RENAL FUNCTION PANEL: CPT

## 2020-06-19 PROCEDURE — 36415 COLL VENOUS BLD VENIPUNCTURE: CPT

## 2020-06-19 PROCEDURE — 83036 HEMOGLOBIN GLYCOSYLATED A1C: CPT

## 2020-06-19 PROCEDURE — 83014 H PYLORI DRUG ADMIN: CPT

## 2020-06-21 LAB — H PYLORI BREATH TEST: NEGATIVE

## 2020-06-25 RX ORDER — LOSARTAN POTASSIUM 100 MG/1
TABLET ORAL
Qty: 30 TABLET | Refills: 2 | Status: SHIPPED | OUTPATIENT
Start: 2020-06-25 | End: 2020-10-26 | Stop reason: SDUPTHER

## 2020-06-25 RX ORDER — HYDROCHLOROTHIAZIDE 25 MG/1
TABLET ORAL
Qty: 30 TABLET | Refills: 2 | Status: SHIPPED | OUTPATIENT
Start: 2020-06-25 | End: 2020-11-03 | Stop reason: SDUPTHER

## 2020-09-10 ENCOUNTER — OFFICE VISIT (OUTPATIENT)
Dept: FAMILY MEDICINE CLINIC | Age: 30
End: 2020-09-10
Payer: COMMERCIAL

## 2020-09-10 VITALS
HEART RATE: 92 BPM | RESPIRATION RATE: 18 BRPM | BODY MASS INDEX: 38.8 KG/M2 | WEIGHT: 271 LBS | OXYGEN SATURATION: 98 % | DIASTOLIC BLOOD PRESSURE: 80 MMHG | SYSTOLIC BLOOD PRESSURE: 130 MMHG | TEMPERATURE: 97.7 F | HEIGHT: 70 IN

## 2020-09-10 PROCEDURE — 99214 OFFICE O/P EST MOD 30 MIN: CPT | Performed by: FAMILY MEDICINE

## 2020-09-10 PROCEDURE — G8417 CALC BMI ABV UP PARAM F/U: HCPCS | Performed by: FAMILY MEDICINE

## 2020-09-10 ASSESSMENT — PATIENT HEALTH QUESTIONNAIRE - PHQ9
SUM OF ALL RESPONSES TO PHQ QUESTIONS 1-9: 0
SUM OF ALL RESPONSES TO PHQ9 QUESTIONS 1 & 2: 0
1. LITTLE INTEREST OR PLEASURE IN DOING THINGS: 0
SUM OF ALL RESPONSES TO PHQ QUESTIONS 1-9: 0
2. FEELING DOWN, DEPRESSED OR HOPELESS: 0

## 2020-09-10 NOTE — PATIENT INSTRUCTIONS
active, even a little bit every day. Making small changes over time can add up to a lot. Make a plan for change. Many people have found that naming their reasons for change and staying focused on their plan can make a big difference. Work with your doctor to create a plan that is right for you. · Ask yourself: Jethro Dryer are my personal, most powerful reasons for wanting this change? What will my life look like when I've made the change? \"  · Set your long-term goal. Make it specific, such as \"I will lose x pounds. \"  · Break your long-term goal into smaller, short-term goals. Make these small steps specific and within your reach, things you know you can do. These steps are what keep you going from day to day. Talk with your doctor about other weight-loss options. If you have a BMI in a certain range and have not been able to lose weight with diet and exercise, medicine or surgery may be an option for you. Before your doctor will prescribe medicines or surgery, he or she will probably want you to be more active and follow your healthy eating plan for a period of time. These habits are key lifelong changes for managing your weight, with or without other medical treatment. And these changes can help you avoid weight-related health problems. How can you stay on your plan for change? Be ready. Choose to start during a time when there are few events that might trigger slip-ups, like holidays, social events, and high-stress periods. Decide on your first few steps. Most people have more success when they make small changes, one step at a time. For example, you might switch a daily candy bar to a piece of fruit, walk 10 minutes more, or add more vegetables to a meal.  Line up your support people. Make sure you're not going to be alone as you make this change. Connect with people who understand how important it is to you. Ask family members and friends for help in keeping with your plan.  And think about who could make it harder for you, and how to handle them. Try tracking. People who keep track of what they eat, feel, and do are better at losing weight. Try writing down things like:  · What and how much you eat. · How you feel before and after each meal.  · Details about each meal (like eating out or at home, eating alone, or with friends or family). · What you do to be active. Look and plan. As you track, look for patterns that you may want to change. Take note of:  · When you eat and whether you skip meals. · How often you eat out. · How many fruits and vegetables you eat. · When you eat beyond feeling full. · When and why you eat for reasons other than being hungry. When you stray from your plan, don't get upset. Figure out what made you slip up and how you can fix it. Can you take medicines or have surgery to lose weight? If you have a BMI in a certain range and have not been able to lose weight with diet and exercise, medicine or surgery may be an option for you. If you have a BMI of at least 30.0 (or a BMI of at least 27.0 and another health problem related to your weight), ask your doctor about weight-loss medicines. They work by making you feel less hungry, making you feel full more quickly, or changing how you digest fat. Medicines are used along with diet changes and more physical activity to help you make lasting changes. If you have a BMI of 40.0 or more (or a BMI of 35.0 or more and another health problem related to your weight), your doctor may talk with you about surgery. Weight-loss surgery has risks, and you will need to work with your doctor to compare the risk of having obesity with the risks of surgery. With any option you choose, you will still need to eat a healthy diet and get regular exercise. Follow-up care is a key part of your treatment and safety. Be sure to make and go to all appointments, and call your doctor if you are having problems.  It's also a good idea to know your test results and keep a list of the medicines you take. Where can you learn more? Go to [email or web address] and sign in to your Meijob account. Enter N111 in the Popset box to learn more about \"Learning About Obesity. \"     If you do not have an account, please click on the \"Sign Up Now\" link. Current as of: December 11, 2019               Content Version: 12.5  © 4735-6663 Healthwise, Incorporated. Care instructions adapted under license by [facility] ([facility]). If you have questions about a medical condition or this instruction, always ask your healthcare professional. [address] any warranty or liability for your use of this information.

## 2020-09-10 NOTE — PROGRESS NOTES
Progress Note    Farhan Kennedy is a 31 y.o.  male who presents today alone for evaluation of   Chief Complaint   Patient presents with    Hyperglycemia    Hyperlipidemia    Hypertension           HPI:   Patient is here for follow up on HTN, GERD, dyslipidemia, and hyperglycemia. Today, patient denies cp/sob/le edema/dizziness/lightheadedness/blurry va/ha. Patient denies excessive OTC stimulants/sodium. Patient denies specific diet. Patient denies regular aerobic activity. Patient denies abd pain/v/n/d. Patient denies blood in stool. Patient denies dark tarry stools. Patient denies wt loss or night sweats. Patient denies recent flare of GERD. Patient has changed his diet and this has helped with his GERD. He knows his GERD triggers now. Patient denies checking BP at home. PHQ-9 Total Score: 0 (9/10/2020  7:25 AM)      Health Maintenance Due   Topic Date Due    Varicella vaccine (1 of 2 - 2-dose childhood series) 02/25/1990    Pneumococcal 0-64 years Vaccine (1 of 1 - PPSV23) 02/25/1995    Flu vaccine (1) 09/01/2020        Current Medications:     Current Outpatient Medications   Medication Sig Dispense Refill    hydroCHLOROthiazide (HYDRODIURIL) 25 MG tablet TAKE 1 TABLET BY MOUTH IN THE MORNING 30 tablet 2    losartan (COZAAR) 100 MG tablet TAKE 1 TABLET BY MOUTH ONE TIME A DAY 30 tablet 2    albuterol sulfate HFA (PROAIR HFA) 108 (90 BASE) MCG/ACT inhaler Inhale 2 puffs into the lungs every 6 hours as needed for Wheezing 1 Inhaler 3     No current facility-administered medications for this visit.         Allergies:   No Known Allergies     Medical History:     Past Medical History:   Diagnosis Date    Hypertension     Mild intermittent asthma without complication 5/68/4246    Tension headache 4/12/2018       Past Surgical History:   Procedure Laterality Date    TONSILLECTOMY AND ADENOIDECTOMY      WISDOM TOOTH EXTRACTION         Family History   Problem Relation Age of Onset    Hypertension Father     Heart Disease Father     Hypertension Maternal Grandmother         Social History:     Social History     Socioeconomic History    Marital status:      Spouse name: Not on file    Number of children: Not on file    Years of education: Not on file    Highest education level: Not on file   Occupational History    Not on file   Social Needs    Financial resource strain: Not on file    Food insecurity     Worry: Not on file     Inability: Not on file   [employer] needs     Medical: Not on file     Non-medical: Not on file   Tobacco Use    Smoking status: Never Smoker    Smokeless tobacco: Never Used   Substance and Sexual Activity    Alcohol use: Yes     Alcohol/week: 0.0 standard drinks     Comment: rare    Drug use: No    Sexual activity: Yes     Partners: Female   Lifestyle    Physical activity     Days per week: Not on file     Minutes per session: Not on file    Stress: Not on file   Relationships    Social connections     Talks on phone: Not on file     Gets together: Not on file     Attends Shinto service: Not on file     Active member of club or organization: Not on file     Attends meetings of clubs or organizations: Not on file     Relationship status: Not on file    Intimate partner violence     Fear of current or ex partner: Not on file     Emotionally abused: Not on file     Physically abused: Not on file     Forced sexual activity: Not on file   Other Topics Concern    Not on file   Social History Narrative    Not on file        ROS:     Constitutional: No fevers, chills, fatigue. ENT: No nasal congestion or sore throat  Respiratory: No difficulty in breathing or cough. Cardiovascular: No chest pain, palpitations or shortness of breath  Gastrointestinal: No abdominal pain or change in bowel movements. Genitourinary: No change in urinary frequency or dysuria. Skin: No rashes or skin lesions. Neurological: No weakness. No headaches. Last Filed Vitals:  /80   Pulse 92   Temp 97.7 °F (36.5 °C) (Temporal)   Resp 18   Ht 5' 10\" (1.778 m)   Wt 271 lb (122.9 kg)   SpO2 98%   BMI 38.88 kg/m²      Physical Examination:     GENERAL APPEARANCE: in no acute distress, well developed, well nourished. HEAD: normocephalic, atraumatic. EYES: extraocular movement intact (EOMI), pupils equal, round, reactive to light and accommodation. EARS: normal, tympanic membrane intact, clear, auditory canal clear. NOSE: nares patent, no erythema, sinuses nontender bilaterally, no rhinorrhea. ORAL CAVITY: mucosa moist, no lesions. THROAT: clear, no mass, no exudate. NECK/THYROID: neck supple, full range of motion, no thyromegaly. HEART: no murmurs, regular rate and rhythm, S1, S2 normal.   LUNGS: clear to auscultation bilaterally, no wheezes, rales, rhonchi. ABDOMEN: normal, bowel sounds present, soft, nontender, nondistended, no rebound guarding or rigidity    Recent Labs/ In Office Testing/ Radiograph review:     Hospital Outpatient Visit on 06/19/2020   Component Date Value Ref Range Status    H Pylori Breath Test 06/19/2020 Negative  Negative Final    Comment: (NOTE)  INTERPRETIVE INFORMATION: H. pylori Breath Test  A negative result does not rule out the possibility of H.pylori   infection. If clinical signs are suggestive of H. pylori   infection, retest with a new specimen or an alternate method. Known causes of false-negative results include:  1. Use of antibiotics, proton pump inhibitors, and bismuth     preparations during the preceding 2 weeks. 2.  Administration of the breath test less than 4 weeks     after completion of definitive therapy to eradicate     H. pylori. 3.  Premature or late collection of the post-dose specimen. Known causes of false-positive results include:  1. Patients with achlorhydria.   2.  Rinsing the testing solution in the mouth or not using      the straw provided in the kit, which can allow contact      with urease-positive bacteria. 3.  The presence of other gastric spiral organisms such as     Helicobacter heilmanii. Performed by [facility],  [place] [address], [address] [facility] [phone]  www                           . Kt Bradshaw MD, Lab. Director      TSH 06/19/2020 0.70  0.30 - 5.00 mIU/L Final    Glucose 06/19/2020 88  70 - 99 mg/dL Final    BUN 06/19/2020 11  6 - 20 mg/dL Final    CREATININE 06/19/2020 0.80  0.70 - 1.20 mg/dL Final    Bun/Cre Ratio 06/19/2020 NOT REPORTED  9 - 20 Final    Calcium 06/19/2020 9.7  8.6 - 10.4 mg/dL Final    Alb 06/19/2020 4.5  3.5 - 5.2 g/dL Final    Phosphorus 06/19/2020 3.0  2.5 - 4.5 mg/dL Final    Sodium 06/19/2020 140  135 - 144 mmol/L Final    Potassium 06/19/2020 3.6* 3.7 - 5.3 mmol/L Final    Chloride 06/19/2020 100  98 - 107 mmol/L Final    CO2 06/19/2020 27  20 - 31 mmol/L Final    Anion Gap 06/19/2020 13  9 - 17 mmol/L Final    GFR Non- 06/19/2020 >60  >60 mL/min Final    GFR  06/19/2020 >60  >60 mL/min Final    GFR Comment 06/19/2020        Final    Comment: Average GFR for 30-36 years old:   80 mL/min/1.73sq m  Chronic Kidney Disease:   <60 mL/min/1.73sq m  Kidney failure:   <15 mL/min/1.73sq m              eGFR calculated using average adult body mass.  Additional eGFR calculator available at:        [email or web address]            GFR Staging 06/19/2020 NOT REPORTED   Final    Magnesium 06/19/2020 2.1  1.6 - 2.6 mg/dL Final    Cholesterol 06/19/2020 178  <200 mg/dL Final    Comment:    Cholesterol Guidelines:      <200  Desirable   200-240  Borderline      >240  Undesirable         HDL 06/19/2020 45  >40 mg/dL Final    Comment:    HDL Guidelines:    <40     Undesirable   40-59    Borderline    >59     Desirable         LDL Cholesterol 06/19/2020 111  0 - 130 mg/dL Final    Comment:    LDL Guidelines:     <100    Desirable   100-129   Near to/above Desirable   130-159   Borderline      >159   Undesirable     Direct (measured) LDL and calculated LDL are not interchangeable tests.  Chol/HDL Ratio 06/19/2020 4.0  <5 Final            Triglycerides 06/19/2020 112  <150 mg/dL Final    Comment:    Triglyceride Guidelines:     <150   Desirable   150-199  Borderline   200-499  High     >499   Very high   Based on AHA Guidelines for fasting triglyceride, October 2012.  VLDL 06/19/2020 NOT REPORTED  1 - 30 mg/dL Final    HIV Ag/Ab 06/19/2020 NONREACTIVE  NONREACTIVE Final    Comment: No laboratory evidence of HIV infection. If acute HIV infection is suspected, consider   testing for HIV-1 RNA.  Hemoglobin A1C 06/19/2020 5.5  4.0 - 6.0 % Final    Estimated Avg Glucose 06/19/2020 111  mg/dL Final    Comment: The ADA and AACC recommend providing the estimated average glucose result to permit better   patient understanding of their HBA1c result.       WBC 06/19/2020 7.0  3.5 - 11.3 k/uL Final    RBC 06/19/2020 5.21  4.21 - 5.77 m/uL Final    Hemoglobin 06/19/2020 13.8  13.0 - 17.0 g/dL Final    Hematocrit 06/19/2020 44.2  40.7 - 50.3 % Final    MCV 06/19/2020 84.8  82.6 - 102.9 fL Final    MCH 06/19/2020 26.5  25.2 - 33.5 pg Final    MCHC 06/19/2020 31.2  28.4 - 34.8 g/dL Final    RDW 06/19/2020 13.9  11.8 - 14.4 % Final    Platelets 37/31/7167 325  138 - 453 k/uL Final    MPV 06/19/2020 10.4  8.1 - 13.5 fL Final    NRBC Automated 06/19/2020 0.0  0.0 per 100 WBC Final    Differential Type 06/19/2020 NOT REPORTED   Final    Seg Neutrophils 06/19/2020 59  36 - 65 % Final    Lymphocytes 06/19/2020 33  24 - 43 % Final    Monocytes 06/19/2020 5  3 - 12 % Final    Eosinophils % 06/19/2020 2  1 - 4 % Final    Basophils 06/19/2020 1  0 - 2 % Final    Immature Granulocytes 06/19/2020 0  0 % Final    Segs Absolute 06/19/2020 4.13  1.50 - 8.10 k/uL Final    Absolute Lymph # 06/19/2020 2.30  1.10 - 3.70 k/uL Final    Absolute Mono # 06/19/2020 0. 38  0.10 - 1.20 k/uL Final    Absolute Eos # 06/19/2020 0.13  0.00 - 0.44 k/uL Final    Basophils Absolute 06/19/2020 0.04  0.00 - 0.20 k/uL Final    Absolute Immature Granulocyte 06/19/2020 <0.03  0.00 - 0.30 k/uL Final    WBC Morphology 06/19/2020 NOT REPORTED   Final    RBC Morphology 06/19/2020 NOT REPORTED   Final    Platelet Estimate 32/40/4241 NOT REPORTED   Final    AST 06/19/2020 15  <40 U/L Final    ALT 06/19/2020 28  5 - 41 U/L Final       No results found for this visit on 09/10/20. Assessment/Plan:     Angela Shultz was seen today for hyperglycemia, hyperlipidemia and hypertension. Diagnoses and all orders for this visit:    HTN, goal below 130/80  -     Magnesium; Future  -     Renal Function Panel; Future    Dyslipidemia  -     ALT; Future  -     AST; Future  -     CBC Auto Differential; Future  -     Lipid Panel; Future    Hyperglycemia  -     Hemoglobin A1C; Future    BMI 38.0-38.9,adult    Dietary counseling  -      BMI ABOVE NORMAL F/U    Follow up on labs. Encouraged well balanced diet. Encouraged 150 mins of aerobic activity weekly. Encouraged medication compliance. Patient declined all vaccinations today. All questions answered and addressed to patient satisfaction. Patient understands and agrees to the plan. The patient was evaluated and treated today based on the osteopathic principle that each person is a unit of body, mind, and spirit, the body is capable of self-regulation, self-healing, and health maintenance and that structure and function are reciprocally interrelated. Follow-up:   Return in about 4 months (around 1/10/2021) for CPE; 40 min appt. Nickie Shaffer D.O.      BMI was elevated today, and weight loss plan recommended is : conventional weight loss.

## 2020-10-26 RX ORDER — LOSARTAN POTASSIUM 100 MG/1
TABLET ORAL
Qty: 30 TABLET | Refills: 2 | Status: SHIPPED | OUTPATIENT
Start: 2020-10-26 | End: 2021-01-11 | Stop reason: SDUPTHER

## 2020-11-06 RX ORDER — HYDROCHLOROTHIAZIDE 25 MG/1
TABLET ORAL
Qty: 90 TABLET | Refills: 0 | Status: SHIPPED | OUTPATIENT
Start: 2020-11-06 | End: 2021-01-11 | Stop reason: SDUPTHER

## 2020-11-06 NOTE — TELEPHONE ENCOUNTER
Patient needs a weeks worth of hydrochlorothiazide 25mg sent to [facility]. He has one pill left and his mail in order will not be here before he runs out.     Last O/V 9/10/20  Next O/V 1/11/21

## 2021-01-11 ENCOUNTER — OFFICE VISIT (OUTPATIENT)
Dept: FAMILY MEDICINE CLINIC | Age: 31
End: 2021-01-11
Payer: COMMERCIAL

## 2021-01-11 VITALS
OXYGEN SATURATION: 98 % | DIASTOLIC BLOOD PRESSURE: 80 MMHG | TEMPERATURE: 97.3 F | HEART RATE: 99 BPM | BODY MASS INDEX: 40.37 KG/M2 | HEIGHT: 70 IN | WEIGHT: 282 LBS | SYSTOLIC BLOOD PRESSURE: 122 MMHG

## 2021-01-11 DIAGNOSIS — Z00.00 WELL ADULT EXAM: Primary | ICD-10-CM

## 2021-01-11 DIAGNOSIS — I10 HTN, GOAL BELOW 130/80: ICD-10-CM

## 2021-01-11 DIAGNOSIS — R73.9 HYPERGLYCEMIA: ICD-10-CM

## 2021-01-11 DIAGNOSIS — Z71.82 EXERCISE COUNSELING: ICD-10-CM

## 2021-01-11 DIAGNOSIS — Z13.89 MULTIPHASIC SCREENING: ICD-10-CM

## 2021-01-11 DIAGNOSIS — Z71.3 DIETARY COUNSELING: ICD-10-CM

## 2021-01-11 DIAGNOSIS — E78.5 DYSLIPIDEMIA: ICD-10-CM

## 2021-01-11 PROCEDURE — G8417 CALC BMI ABV UP PARAM F/U: HCPCS | Performed by: FAMILY MEDICINE

## 2021-01-11 PROCEDURE — 99395 PREV VISIT EST AGE 18-39: CPT | Performed by: FAMILY MEDICINE

## 2021-01-11 RX ORDER — HYDROCHLOROTHIAZIDE 25 MG/1
TABLET ORAL
Qty: 90 TABLET | Refills: 1 | Status: SHIPPED | OUTPATIENT
Start: 2021-01-11 | End: 2021-05-21

## 2021-01-11 RX ORDER — LOSARTAN POTASSIUM 100 MG/1
TABLET ORAL
Qty: 90 TABLET | Refills: 1 | Status: SHIPPED | OUTPATIENT
Start: 2021-01-11 | End: 2021-05-21

## 2021-01-11 ASSESSMENT — PATIENT HEALTH QUESTIONNAIRE - PHQ9
SUM OF ALL RESPONSES TO PHQ9 QUESTIONS 1 & 2: 0
2. FEELING DOWN, DEPRESSED OR HOPELESS: 0
SUM OF ALL RESPONSES TO PHQ QUESTIONS 1-9: 0

## 2021-01-11 NOTE — PATIENT INSTRUCTIONS
Learning About Obesity  What is obesity? Obesity means having a body mass index (BMI) of 30 or above. BMI is a number that is calculated from your weight and your height. How do you know if your weight is in the obesity range? To know if your weight is in the obesity range, your doctor looks at your body mass index (BMI) and waist size. BMI is a number that is calculated from your weight and your height. To figure out your BMI for yourself, you can use an online tool, such as http://www.cai.com/ on the dotHIV Data of [employer]. If your BMI is 30.0 or higher, it falls within the obesity range. Keep in mind that BMI and waist size are only guides. They are not tools to determine your ideal body weight. What causes obesity? When you take in more calories than you burn off, you gain weight. How you eat, how active you are, and other things affect how your body uses calories and whether you gain weight. If you have family members who have too much body fat, you may have inherited a tendency to gain weight. And your family also helps form your eating and lifestyle habits, which can lead to obesity. Also, our busy lives make it harder to plan and cook healthy meals. For many of us, it's easier to reach for prepared foods, go out to eat, or go to the drive-through. But these foods are often high in saturated fat and calories. Portions are often too large. What can you do to reach a healthy weight? Focus on health, not diets. Diets are hard to stay on and don't work in the long run. It is very hard to stay with a diet that includes lots of big changes in your eating habits. Instead of a diet, focus on lifestyle changes that will improve your health and achieve the right balance of energy and calories. To lose weight, you need to burn more calories than you take in. You can do it by eating healthy foods in reasonable amounts and becoming more active, even a little bit every day. Making small changes over time can add up to a lot. Make a plan for change. Many people have found that naming their reasons for change and staying focused on their plan can make a big difference. Work with your doctor to create a plan that is right for you. · Ask yourself: Karthikeyan Reid are my personal, most powerful reasons for wanting this change? What will my life look like when I've made the change? \"  · Set your long-term goal. Make it specific, such as \"I will lose x pounds. \"  · Break your long-term goal into smaller, short-term goals. Make these small steps specific and within your reach, things you know you can do. These steps are what keep you going from day to day. Talk with your doctor about other weight-loss options. If you have a BMI in a certain range and have not been able to lose weight with diet and exercise, medicine or surgery may be an option for you. Before your doctor will prescribe medicines or surgery, he or she will probably want you to be more active and follow your healthy eating plan for a period of time. These habits are key lifelong changes for managing your weight, with or without other medical treatment. And these changes can help you avoid weight-related health problems. How can you stay on your plan for change? Be ready. Choose to start during a time when there are few events that might trigger slip-ups, like holidays, social events, and high-stress periods. Decide on your first few steps. Most people have more success when they make small changes, one step at a time. For example, you might switch a daily candy bar to a piece of fruit, walk 10 minutes more, or add more vegetables to a meal.  Line up your support people. Make sure you're not going to be alone as you make this change. Connect with people who understand how important it is to you. Ask family members and friends for help in keeping with your plan. And think about who could make it harder for you, and how to handle them. Try tracking. People who keep track of what they eat, feel, and do are better at losing weight. Try writing down things like:  · What and how much you eat. · How you feel before and after each meal.  · Details about each meal (like eating out or at home, eating alone, or with friends or family). · What you do to be active. Look and plan. As you track, look for patterns that you may want to change. Take note of:  · When you eat and whether you skip meals. · How often you eat out. · How many fruits and vegetables you eat. · When you eat beyond feeling full. · When and why you eat for reasons other than being hungry. When you stray from your plan, don't get upset. Figure out what made you slip up and how you can fix it. Can you take medicines or have surgery to lose weight? If you have a BMI in a certain range and have not been able to lose weight with diet and exercise, medicine or surgery may be an option for you. If you have a BMI of at least 30.0 (or a BMI of at least 27.0 and another health problem related to your weight), ask your doctor about weight-loss medicines. They work by making you feel less hungry, making you feel full more quickly, or changing how you digest fat. Medicines are used along with diet changes and more physical activity to help you make lasting changes. If you have a BMI of 40.0 or more (or a BMI of 35.0 or more and another health problem related to your weight), your doctor may talk with you about surgery. Weight-loss surgery has risks, and you will need to work with your doctor to compare the risk of having obesity with the risks of surgery. With any option you choose, you will still need to eat a healthy diet and get regular exercise. Follow-up care is a key part of your treatment and safety. Be sure to make and go to all appointments, and call your doctor if you are having problems. It's also a good idea to know your test results and keep a list of the medicines you take. Where can you learn more? Go to https://ReelGeniepeTailored.Chanyouji. org and sign in to your [employer]2u account. Enter N111 in the [employer] box to learn more about \"Learning About Obesity. \"     If you do not have an account, please click on the \"Sign Up Now\" link. Current as of: December 11, 2019               Content Version: 12.6  © 7090-8666 [employer]. Care instructions adapted under license by [facility] [place] ([facility]). If you have questions about a medical condition or this instruction, always ask your healthcare professional. [address] any warranty or liability for your use of this information.

## 2021-01-11 NOTE — PROGRESS NOTES
Progress Note    Maliha Carlos is a 31 y.o.  male who presents today alone for evaluation of   Chief Complaint   Patient presents with    Annual Exam           HPI:   Patient is here for CPE today. Patient PMH HTN and asthma. Patient PSH T&A and wisdom tooth extraction. Patient fhx HTN dad and MGM HTN. Patient denies tobacco use. Patient admits to occasional EtOH consumption. Patient denies illicits. Patient denies SIG E CAPS. Patient denies SI/HI. Patient denies anxiety. Patient tries to adhere to the [address] Patient denies regular aerobic exercise. PHQ-9 Total Score: 0 (1/11/2021  7:49 AM)    Patient denies cp/sob/le edema/dizziness/lightheadedness/blurry va/ha. Patient denies PND/orthopnea. Patient does need a refill today of his BP meds today. Health Maintenance Due   Topic Date Due    Hepatitis C screen  1989    Varicella vaccine (1 of 2 - 2-dose childhood series) 02/25/1990    Pneumococcal 0-64 years Vaccine (1 of 1 - PPSV23) 02/25/1995    Flu vaccine (1) 09/01/2020        Current Medications:     Current Outpatient Medications   Medication Sig Dispense Refill    hydroCHLOROthiazide (HYDRODIURIL) 25 MG tablet TAKE 1 TABLET BY MOUTH IN THE MORNING 90 tablet 1    losartan (COZAAR) 100 MG tablet TAKE 1 TABLET BY MOUTH ONE TIME A DAY 90 tablet 1    albuterol sulfate HFA (PROAIR HFA) 108 (90 BASE) MCG/ACT inhaler Inhale 2 puffs into the lungs every 6 hours as needed for Wheezing 1 Inhaler 3     No current facility-administered medications for this visit.         Allergies:   No Known Allergies     Medical History:     Past Medical History:   Diagnosis Date    Hypertension     Mild intermittent asthma without complication 4/94/7268    Tension headache 4/12/2018       Past Surgical History:   Procedure Laterality Date    TONSILLECTOMY AND ADENOIDECTOMY      WISDOM TOOTH EXTRACTION            Social History:     Social History     Socioeconomic History    Marital status:      Spouse name: Not on file    Number of children: Not on file    Years of education: Not on file    Highest education level: Not on file   Occupational History    Not on file   Social Needs    Financial resource strain: Not on file    Food insecurity     Worry: Not on file     Inability: Not on file    Transportation needs     Medical: Not on file     Non-medical: Not on file   Tobacco Use    Smoking status: Never Smoker    Smokeless tobacco: Never Used   Substance and Sexual Activity    Alcohol use: Yes     Alcohol/week: 0.0 standard drinks     Comment: rare    Drug use: No    Sexual activity: Yes     Partners: Female   Lifestyle    Physical activity     Days per week: Not on file     Minutes per session: Not on file    Stress: Not on file   Relationships    Social connections     Talks on phone: Not on file     Gets together: Not on file     Attends [employer] service: Not on file     Active member of club or organization: Not on file     Attends meetings of clubs or organizations: Not on file     Relationship status: Not on file    Intimate partner violence     Fear of current or ex partner: Not on file     Emotionally abused: Not on file     Physically abused: Not on file     Forced sexual activity: Not on file   Other Topics Concern    Not on file   Social History Narrative    Not on file        ROS:     Constitutional: No fevers, chills; +fatigue. ENT: No nasal congestion or sore throat  Respiratory: No difficulty in breathing or cough. Cardiovascular: No chest pain, palpitations or shortness of breath  Gastrointestinal: No abdominal pain or change in bowel movements. +EHSAN  Genitourinary: No change in urinary frequency or dysuria. Skin: No rashes or skin lesions. Neurological: No weakness. No headaches.          Last Filed Vitals:  /80   Pulse 99   Temp 97.3 °F (36.3 °C) (Temporal)   Ht 5' 9.5\" (1.765 m)   Wt 282 lb (127.9 kg)   SpO2 98%   BMI 41.05 kg/m² Physical Examination:     GENERAL APPEARANCE: in no acute distress, well developed, well nourished. HEAD: normocephalic, atraumatic. EYES: extraocular movement intact (EOMI), pupils equal, round, reactive to light and accommodation. EARS: normal, tympanic membrane intact, clear, auditory canal clear. NOSE: nares patent, no erythema, sinuses nontender bilaterally, no rhinorrhea. ORAL CAVITY: mucosa moist, no lesions. THROAT: clear, no mass, no exudate. NECK/THYROID: neck supple, full range of motion, no thyromegaly. HEART: no murmurs, regular rate and rhythm, S1, S2 normal.   LUNGS: clear to auscultation bilaterally, no wheezes, rales, rhonchi. ABDOMEN: normal, bowel sounds present, soft, nontender, nondistended, no rebound guarding or rigidity    Recent Labs/ In Office Testing/ Radiograph review:     Hospital Outpatient Visit on 06/19/2020   Component Date Value Ref Range Status    H Pylori Breath Test 06/19/2020 Negative  Negative Final    Comment: (NOTE)  INTERPRETIVE INFORMATION: H. pylori Breath Test  A negative result does not rule out the possibility of H.pylori   infection. If clinical signs are suggestive of H. pylori   infection, retest with a new specimen or an alternate method. Known causes of false-negative results include:  1. Use of antibiotics, proton pump inhibitors, and bismuth     preparations during the preceding 2 weeks. 2.  Administration of the breath test less than 4 weeks     after completion of definitive therapy to eradicate     H. pylori. 3.  Premature or late collection of the post-dose specimen. Known causes of false-positive results include:  1. Patients with achlorhydria. 2.  Rinsing the testing solution in the mouth or not using      the straw provided in the kit, which can allow contact      with urease-positive bacteria. 3.  The presence of other gastric spiral organisms such as     Helicobacter heilmanii.   Performed by [facility],  [address] [place] [address] [address] [phone]  www                           . Ryan Carrion MD, Lab. Director      TSH 06/19/2020 0.70  0.30 - 5.00 mIU/L Final    Glucose 06/19/2020 88  70 - 99 mg/dL Final    BUN 06/19/2020 11  6 - 20 mg/dL Final    CREATININE 06/19/2020 0.80  0.70 - 1.20 mg/dL Final    Bun/Cre Ratio 06/19/2020 NOT REPORTED  9 - 20 Final    Calcium 06/19/2020 9.7  8.6 - 10.4 mg/dL Final    Alb 06/19/2020 4.5  3.5 - 5.2 g/dL Final    Phosphorus 06/19/2020 3.0  2.5 - 4.5 mg/dL Final    Sodium 06/19/2020 140  135 - 144 mmol/L Final    Potassium 06/19/2020 3.6* 3.7 - 5.3 mmol/L Final    Chloride 06/19/2020 100  98 - 107 mmol/L Final    CO2 06/19/2020 27  20 - 31 mmol/L Final    Anion Gap 06/19/2020 13  9 - 17 mmol/L Final    GFR Non- 06/19/2020 >60  >60 mL/min Final    GFR  06/19/2020 >60  >60 mL/min Final    GFR Comment 06/19/2020        Final    Comment: Average GFR for 30-36 years old:   80 mL/min/1.73sq m  Chronic Kidney Disease:   <60 mL/min/1.73sq m  Kidney failure:   <15 mL/min/1.73sq m              eGFR calculated using average adult body mass. Additional eGFR calculator available at:        [employer]            GFR Staging 06/19/2020 NOT REPORTED   Final    Magnesium 06/19/2020 2.1  1.6 - 2.6 mg/dL Final    Cholesterol 06/19/2020 178  <200 mg/dL Final    Comment:    Cholesterol Guidelines:      <200  Desirable   200-240  Borderline      >240  Undesirable         HDL 06/19/2020 45  >40 mg/dL Final    Comment:    HDL Guidelines:    <40     Undesirable   40-59    Borderline    >59     Desirable         LDL Cholesterol 06/19/2020 111  0 - 130 mg/dL Final    Comment:    LDL Guidelines:     <100    Desirable   100-129   Near to/above Desirable   130-159   Borderline      >159   Undesirable     Direct (measured) LDL and calculated LDL are not interchangeable tests.       Chol/HDL Ratio 06/19/2020 4.0  <5 Final  Triglycerides 06/19/2020 112  <150 mg/dL Final    Comment:    Triglyceride Guidelines:     <150   Desirable   150-199  Borderline   200-499  High     >499   Very high   Based on AHA Guidelines for fasting triglyceride, October 2012.  VLDL 06/19/2020 NOT REPORTED  1 - 30 mg/dL Final    HIV Ag/Ab 06/19/2020 NONREACTIVE  NONREACTIVE Final    Comment: No laboratory evidence of HIV infection. If acute HIV infection is suspected, consider   testing for HIV-1 RNA.  Hemoglobin A1C 06/19/2020 5.5  4.0 - 6.0 % Final    Estimated Avg Glucose 06/19/2020 111  mg/dL Final    Comment: The ADA and AACC recommend providing the estimated average glucose result to permit better   patient understanding of their HBA1c result.       WBC 06/19/2020 7.0  3.5 - 11.3 k/uL Final    RBC 06/19/2020 5.21  4.21 - 5.77 m/uL Final    Hemoglobin 06/19/2020 13.8  13.0 - 17.0 g/dL Final    Hematocrit 06/19/2020 44.2  40.7 - 50.3 % Final    MCV 06/19/2020 84.8  82.6 - 102.9 fL Final    MCH 06/19/2020 26.5  25.2 - 33.5 pg Final    MCHC 06/19/2020 31.2  28.4 - 34.8 g/dL Final    RDW 06/19/2020 13.9  11.8 - 14.4 % Final    Platelets 50/19/6800 325  138 - 453 k/uL Final    MPV 06/19/2020 10.4  8.1 - 13.5 fL Final    NRBC Automated 06/19/2020 0.0  0.0 per 100 WBC Final    Differential Type 06/19/2020 NOT REPORTED   Final    Seg Neutrophils 06/19/2020 59  36 - 65 % Final    Lymphocytes 06/19/2020 33  24 - 43 % Final    Monocytes 06/19/2020 5  3 - 12 % Final    Eosinophils % 06/19/2020 2  1 - 4 % Final    Basophils 06/19/2020 1  0 - 2 % Final    Immature Granulocytes 06/19/2020 0  0 % Final    Segs Absolute 06/19/2020 4.13  1.50 - 8.10 k/uL Final    Absolute Lymph # 06/19/2020 2.30  1.10 - 3.70 k/uL Final    Absolute Mono # 06/19/2020 0.38  0.10 - 1.20 k/uL Final    Absolute Eos # 06/19/2020 0.13  0.00 - 0.44 k/uL Final    Basophils Absolute 06/19/2020 0.04  0.00 - 0.20 k/uL Final    Absolute Immature Granulocyte 06/19/2020 <0.03  0.00 - 0.30 k/uL Final    WBC Morphology 06/19/2020 NOT REPORTED   Final    RBC Morphology 06/19/2020 NOT REPORTED   Final    Platelet Estimate 46/63/7114 NOT REPORTED   Final    AST 06/19/2020 15  <40 U/L Final    ALT 06/19/2020 28  5 - 41 U/L Final       No results found for this visit on 01/11/21. Assessment/Plan:     Aspen Don was seen today for annual exam.    Diagnoses and all orders for this visit:    Well adult exam    BMI 40.0-44.9, adult [facility])    Exercise counseling    Dietary counseling  -      BMI ABOVE NORMAL F/U    Multiphasic screening  -     Hepatitis C Antibody; Future    Dyslipidemia  -     ALT; Future  -     AST; Future  -     CBC Auto Differential; Future  -     Lipid Panel; Future  -     TSH with Reflex; Future    HTN, goal below 130/80  -     Magnesium; Future  -     Renal Function Panel; Future  -     hydroCHLOROthiazide (HYDRODIURIL) 25 MG tablet; TAKE 1 TABLET BY MOUTH IN THE MORNING  -     losartan (COZAAR) 100 MG tablet; TAKE 1 TABLET BY MOUTH ONE TIME A DAY    Hyperglycemia  -     Hemoglobin A1C; Future    Follow up on labs. Encouraged 150 mins of aerobic activity daily. Encouraged low carb diet. Refills as above. Asked patient to check his BP at home and log. All questions answered and addressed to patient satisfaction. Patient understands and agrees to the plan. The patient was evaluated and treated today based on the osteopathic principle that each person is a unit of body, mind, and spirit, the body is capable of self-regulation, self-healing, and health maintenance and that structure and function are reciprocally interrelated. Follow-up:   Return in about 6 months (around 7/11/2021) for htn; 20 min appt. Curly Patrick D.O.      BMI was elevated today, and weight loss plan recommended is : conventional weight loss.

## 2021-08-12 ENCOUNTER — OFFICE VISIT (OUTPATIENT)
Dept: FAMILY MEDICINE CLINIC | Age: 31
End: 2021-08-12
Payer: COMMERCIAL

## 2021-08-12 VITALS
BODY MASS INDEX: 39.68 KG/M2 | OXYGEN SATURATION: 97 % | HEART RATE: 106 BPM | WEIGHT: 272.6 LBS | DIASTOLIC BLOOD PRESSURE: 76 MMHG | SYSTOLIC BLOOD PRESSURE: 130 MMHG | TEMPERATURE: 97 F

## 2021-08-12 DIAGNOSIS — E78.5 DYSLIPIDEMIA: ICD-10-CM

## 2021-08-12 DIAGNOSIS — R73.9 HYPERGLYCEMIA: ICD-10-CM

## 2021-08-12 DIAGNOSIS — I10 HTN, GOAL BELOW 130/80: Primary | ICD-10-CM

## 2021-08-12 DIAGNOSIS — Z71.3 DIETARY COUNSELING: ICD-10-CM

## 2021-08-12 PROCEDURE — 99214 OFFICE O/P EST MOD 30 MIN: CPT | Performed by: FAMILY MEDICINE

## 2021-08-12 PROCEDURE — G8417 CALC BMI ABV UP PARAM F/U: HCPCS | Performed by: FAMILY MEDICINE

## 2021-08-12 SDOH — ECONOMIC STABILITY: FOOD INSECURITY: WITHIN THE PAST 12 MONTHS, THE FOOD YOU BOUGHT JUST DIDN'T LAST AND YOU DIDN'T HAVE MONEY TO GET MORE.: NEVER TRUE

## 2021-08-12 SDOH — ECONOMIC STABILITY: FOOD INSECURITY: WITHIN THE PAST 12 MONTHS, YOU WORRIED THAT YOUR FOOD WOULD RUN OUT BEFORE YOU GOT MONEY TO BUY MORE.: NEVER TRUE

## 2021-08-12 ASSESSMENT — PATIENT HEALTH QUESTIONNAIRE - PHQ9
SUM OF ALL RESPONSES TO PHQ QUESTIONS 1-9: 0
1. LITTLE INTEREST OR PLEASURE IN DOING THINGS: 0
SUM OF ALL RESPONSES TO PHQ QUESTIONS 1-9: 0
SUM OF ALL RESPONSES TO PHQ QUESTIONS 1-9: 0
2. FEELING DOWN, DEPRESSED OR HOPELESS: 0
SUM OF ALL RESPONSES TO PHQ9 QUESTIONS 1 & 2: 0

## 2021-08-12 ASSESSMENT — SOCIAL DETERMINANTS OF HEALTH (SDOH): HOW HARD IS IT FOR YOU TO PAY FOR THE VERY BASICS LIKE FOOD, HOUSING, MEDICAL CARE, AND HEATING?: NOT HARD AT ALL

## 2021-08-12 NOTE — PROGRESS NOTES
History:     Social History     Socioeconomic History    Marital status:      Spouse name: Not on file    Number of children: Not on file    Years of education: Not on file    Highest education level: Not on file   Occupational History    Not on file   Tobacco Use    Smoking status: Never Smoker    Smokeless tobacco: Never Used   Vaping Use    Vaping Use: Never used   Substance and Sexual Activity    Alcohol use: Yes     Alcohol/week: 0.0 standard drinks     Comment: rare    Drug use: No    Sexual activity: Yes     Partners: Female   Other Topics Concern    Not on file   Social History Narrative    Not on file     Social Determinants of Health     Financial Resource Strain: Low Risk     Difficulty of Paying Living Expenses: Not hard at all   Food Insecurity: No Food Insecurity    Worried About [address] in the Last Year: Never true    [address]  [employer] in the Last Year: Never true   Transportation Needs:     Lack of Transportation (Medical):  Lack of Transportation (Non-Medical):    Physical Activity:     Days of Exercise per Week:     Minutes of Exercise per Session:    Stress:     Feeling of Stress :    Social Connections:     Frequency of Communication with Friends and Family:     Frequency of Social Gatherings with Friends and Family:     Attends Denominational Services:     Active Member of Clubs or Organizations:     Attends Club or Organization Meetings:     Marital Status:    Intimate Partner Violence:     Fear of Current or Ex-Partner:     Emotionally Abused:     Physically Abused:     Sexually Abused:         ROS:     Constitutional: No fevers, chills, fatigue. ENT: No nasal congestion or sore throat  Respiratory: No difficulty in breathing or cough. Cardiovascular: No chest pain, palpitations or shortness of breath  Gastrointestinal: No abdominal pain or change in bowel movements. Genitourinary: No change in urinary frequency or dysuria.   Skin: No rashes or provided in the kit, which can allow contact      with urease-positive bacteria. 3.  The presence of other gastric spiral organisms such as     Helicobacter heilmanii. Performed by [facility] [address], [address] [facility] [phone]  www                           . Mary Lezama MD, Lab. Director      TSH 06/19/2020 0.70  0.30 - 5.00 mIU/L Final    Glucose 06/19/2020 88  70 - 99 mg/dL Final    BUN 06/19/2020 11  6 - 20 mg/dL Final    CREATININE 06/19/2020 0.80  0.70 - 1.20 mg/dL Final    Bun/Cre Ratio 06/19/2020 NOT REPORTED  9 - 20 Final    Calcium 06/19/2020 9.7  8.6 - 10.4 mg/dL Final    Albumin 06/19/2020 4.5  3.5 - 5.2 g/dL Final    Phosphorus 06/19/2020 3.0  2.5 - 4.5 mg/dL Final    Sodium 06/19/2020 140  135 - 144 mmol/L Final    Potassium 06/19/2020 3.6* 3.7 - 5.3 mmol/L Final    Chloride 06/19/2020 100  98 - 107 mmol/L Final    CO2 06/19/2020 27  20 - 31 mmol/L Final    Anion Gap 06/19/2020 13  9 - 17 mmol/L Final    GFR Non- 06/19/2020 >60  >60 mL/min Final    GFR  06/19/2020 >60  >60 mL/min Final    GFR Comment 06/19/2020        Final    Comment: Average GFR for 30-36 years old:   80 mL/min/1.73sq m  Chronic Kidney Disease:   <60 mL/min/1.73sq m  Kidney failure:   <15 mL/min/1.73sq m              eGFR calculated using average adult body mass.  Additional eGFR calculator available at:        [employer]            GFR Staging 06/19/2020 NOT REPORTED   Final    Magnesium 06/19/2020 2.1  1.6 - 2.6 mg/dL Final    Cholesterol 06/19/2020 178  <200 mg/dL Final    Comment:    Cholesterol Guidelines:      <200  Desirable   200-240  Borderline      >240  Undesirable         HDL 06/19/2020 45  >40 mg/dL Final    Comment:    HDL Guidelines:    <40     Undesirable   40-59    Borderline    >59     Desirable         LDL Cholesterol 06/19/2020 111  0 - 130 mg/dL Final    Comment:    LDL Guidelines:     <100 Desirable   100-129   Near to/above Desirable   130-159   Borderline      >159   Undesirable     Direct (measured) LDL and calculated LDL are not interchangeable tests.  Chol/HDL Ratio 06/19/2020 4.0  <5 Final            Triglycerides 06/19/2020 112  <150 mg/dL Final    Comment:    Triglyceride Guidelines:     <150   Desirable   150-199  Borderline   200-499  High     >499   Very high   Based on AHA Guidelines for fasting triglyceride, October 2012.  VLDL 06/19/2020 NOT REPORTED  1 - 30 mg/dL Final    HIV Ag/Ab 06/19/2020 NONREACTIVE  NONREACTIVE Final    Comment: No laboratory evidence of HIV infection. If acute HIV infection is suspected, consider   testing for HIV-1 RNA.  Hemoglobin A1C 06/19/2020 5.5  4.0 - 6.0 % Final    Estimated Avg Glucose 06/19/2020 111  mg/dL Final    Comment: The ADA and AACC recommend providing the estimated average glucose result to permit better   patient understanding of their HBA1c result.       WBC 06/19/2020 7.0  3.5 - 11.3 k/uL Final    RBC 06/19/2020 5.21  4.21 - 5.77 m/uL Final    Hemoglobin 06/19/2020 13.8  13.0 - 17.0 g/dL Final    Hematocrit 06/19/2020 44.2  40.7 - 50.3 % Final    MCV 06/19/2020 84.8  82.6 - 102.9 fL Final    MCH 06/19/2020 26.5  25.2 - 33.5 pg Final    MCHC 06/19/2020 31.2  28.4 - 34.8 g/dL Final    RDW 06/19/2020 13.9  11.8 - 14.4 % Final    Platelets 23/20/2917 325  138 - 453 k/uL Final    MPV 06/19/2020 10.4  8.1 - 13.5 fL Final    NRBC Automated 06/19/2020 0.0  0.0 per 100 WBC Final    Differential Type 06/19/2020 NOT REPORTED   Final    Seg Neutrophils 06/19/2020 59  36 - 65 % Final    Lymphocytes 06/19/2020 33  24 - 43 % Final    Monocytes 06/19/2020 5  3 - 12 % Final    Eosinophils % 06/19/2020 2  1 - 4 % Final    Basophils 06/19/2020 1  0 - 2 % Final    Immature Granulocytes 06/19/2020 0  0 % Final    Segs Absolute 06/19/2020 4.13  1.50 - 8.10 k/uL Final    Absolute Lymph # 06/19/2020 2.30  1.10 - 3.70 k/uL

## 2021-08-12 NOTE — PATIENT INSTRUCTIONS

## 2021-08-13 DIAGNOSIS — I10 HTN, GOAL BELOW 130/80: ICD-10-CM

## 2021-08-16 RX ORDER — LOSARTAN POTASSIUM 100 MG/1
TABLET ORAL
Qty: 90 TABLET | Refills: 0 | Status: SHIPPED | OUTPATIENT
Start: 2021-08-16 | End: 2021-10-06 | Stop reason: SDUPTHER

## 2021-08-16 RX ORDER — HYDROCHLOROTHIAZIDE 25 MG/1
TABLET ORAL
Qty: 90 TABLET | Refills: 0 | Status: SHIPPED | OUTPATIENT
Start: 2021-08-16 | End: 2021-10-06 | Stop reason: SDUPTHER

## 2021-08-16 NOTE — TELEPHONE ENCOUNTER
Sampson Underwood is calling to request a refill on the following medication(s):    Medication Request:  Requested Prescriptions     Pending Prescriptions Disp Refills    hydroCHLOROthiazide (HYDRODIURIL) 25 MG tablet [Pharmacy Med Name: hydroCHLOROthiazide 25 MG Oral Tablet] 90 tablet 3     Sig: TAKE 1 TABLET BY MOUTH IN  THE MORNING    losartan (COZAAR) 100 MG tablet [Pharmacy Med Name: LOSARTAN  100MG  TAB] 90 tablet 3     Sig: TAKE 1 TABLET BY MOUTH ONCE DAILY       Last Visit Date (If Applicable):  2/87/4583 In office     Next Visit Date:    2/14/2022

## 2021-10-06 DIAGNOSIS — I10 HTN, GOAL BELOW 130/80: ICD-10-CM

## 2021-10-06 NOTE — TELEPHONE ENCOUNTER
Call patient and remind him that he needs to have his labs completed as he has not done so the last 2 times ordered.  Will fill with confirmation

## 2021-10-06 NOTE — TELEPHONE ENCOUNTER
Remington Claros is calling to request a refill on the following medication(s):    Medication Request:  Requested Prescriptions     Pending Prescriptions Disp Refills    hydroCHLOROthiazide (HYDRODIURIL) 25 MG tablet 90 tablet 1    losartan (COZAAR) 100 MG tablet 90 tablet 1       Last Visit Date (If Applicable):  8/54/8808    Next Visit Date:    2/14/2022

## 2021-10-07 RX ORDER — HYDROCHLOROTHIAZIDE 25 MG/1
TABLET ORAL
Qty: 90 TABLET | Refills: 1 | Status: SHIPPED | OUTPATIENT
Start: 2021-10-07

## 2021-10-07 RX ORDER — LOSARTAN POTASSIUM 100 MG/1
TABLET ORAL
Qty: 90 TABLET | Refills: 1 | Status: SHIPPED | OUTPATIENT
Start: 2021-10-07

## 2022-03-25 ENCOUNTER — HOSPITAL ENCOUNTER (OUTPATIENT)
Age: 32
Discharge: HOME | End: 2022-03-25
Payer: COMMERCIAL

## 2022-03-25 DIAGNOSIS — R73.9 HYPERGLYCEMIA: ICD-10-CM

## 2022-03-25 DIAGNOSIS — E78.5 DYSLIPIDEMIA: ICD-10-CM

## 2022-03-25 DIAGNOSIS — I10 HTN, GOAL BELOW 130/80: ICD-10-CM

## 2022-03-25 LAB
ABSOLUTE EOS #: 0.11 K/UL (ref 0–0.44)
ABSOLUTE IMMATURE GRANULOCYTE: <0.03 K/UL (ref 0–0.3)
ABSOLUTE LYMPH #: 2.29 K/UL (ref 1.1–3.7)
ABSOLUTE MONO #: 0.52 K/UL (ref 0.1–1.2)
ALBUMIN SERPL-MCNC: 4.8 G/DL (ref 3.5–5.2)
ALT SERPL-CCNC: 33 U/L (ref 5–41)
ANION GAP SERPL CALCULATED.3IONS-SCNC: 18 MMOL/L (ref 9–17)
AST SERPL-CCNC: 18 U/L
BASOPHILS # BLD: 1 % (ref 0–2)
BASOPHILS ABSOLUTE: 0.04 K/UL (ref 0–0.2)
BUN BLDV-MCNC: 13 MG/DL (ref 6–20)
CALCIUM SERPL-MCNC: 9.8 MG/DL (ref 8.6–10.4)
CHLORIDE BLD-SCNC: 103 MMOL/L (ref 98–107)
CHOLESTEROL/HDL RATIO: 4.3
CHOLESTEROL: 191 MG/DL
CO2: 24 MMOL/L (ref 20–31)
CREAT SERPL-MCNC: 0.83 MG/DL (ref 0.7–1.2)
EOSINOPHILS RELATIVE PERCENT: 2 % (ref 1–4)
ESTIMATED AVERAGE GLUCOSE: 114 MG/DL
GFR AFRICAN AMERICAN: >60 ML/MIN
GFR NON-AFRICAN AMERICAN: >60 ML/MIN
GFR SERPL CREATININE-BSD FRML MDRD: ABNORMAL ML/MIN/{1.73_M2}
GLUCOSE BLD-MCNC: 94 MG/DL (ref 70–99)
HBA1C MFR BLD: 5.6 % (ref 4–6)
HCT VFR BLD CALC: 41.7 % (ref 40.7–50.3)
HDLC SERPL-MCNC: 44 MG/DL
HEMOGLOBIN: 13.4 G/DL (ref 13–17)
IMMATURE GRANULOCYTES: 0 %
LDL CHOLESTEROL: 112 MG/DL (ref 0–130)
LYMPHOCYTES # BLD: 33 % (ref 24–43)
MAGNESIUM: 2.1 MG/DL (ref 1.6–2.6)
MCH RBC QN AUTO: 27.3 PG (ref 25.2–33.5)
MCHC RBC AUTO-ENTMCNC: 32.1 G/DL (ref 28.4–34.8)
MCV RBC AUTO: 85.1 FL (ref 82.6–102.9)
MONOCYTES # BLD: 8 % (ref 3–12)
NRBC AUTOMATED: 0 PER 100 WBC
PDW BLD-RTO: 13.7 % (ref 11.8–14.4)
PHOSPHORUS: 3 MG/DL (ref 2.5–4.5)
PLATELET # BLD: 334 K/UL (ref 138–453)
PMV BLD AUTO: 9.9 FL (ref 8.1–13.5)
POTASSIUM SERPL-SCNC: 3.9 MMOL/L (ref 3.7–5.3)
RBC # BLD: 4.9 M/UL (ref 4.21–5.77)
SEG NEUTROPHILS: 56 % (ref 36–65)
SEGMENTED NEUTROPHILS ABSOLUTE COUNT: 3.88 K/UL (ref 1.5–8.1)
SODIUM BLD-SCNC: 145 MMOL/L (ref 135–144)
TRIGL SERPL-MCNC: 177 MG/DL
TSH SERPL DL<=0.05 MIU/L-ACNC: 0.89 MIU/L (ref 0.3–5)
WBC # BLD: 6.9 K/UL (ref 3.5–11.3)

## 2022-03-25 PROCEDURE — 84443 ASSAY THYROID STIM HORMONE: CPT

## 2022-03-25 PROCEDURE — 84450 TRANSFERASE (AST) (SGOT): CPT

## 2022-03-25 PROCEDURE — 80069 RENAL FUNCTION PANEL: CPT

## 2022-03-25 PROCEDURE — 85025 COMPLETE CBC W/AUTO DIFF WBC: CPT

## 2022-03-25 PROCEDURE — 84460 ALANINE AMINO (ALT) (SGPT): CPT

## 2022-03-25 PROCEDURE — 83036 HEMOGLOBIN GLYCOSYLATED A1C: CPT

## 2022-03-25 PROCEDURE — 83735 ASSAY OF MAGNESIUM: CPT

## 2022-03-25 PROCEDURE — 36415 COLL VENOUS BLD VENIPUNCTURE: CPT

## 2022-03-25 PROCEDURE — 80061 LIPID PANEL: CPT

## 2022-03-31 ENCOUNTER — OFFICE VISIT (OUTPATIENT)
Dept: FAMILY MEDICINE CLINIC | Age: 32
End: 2022-03-31
Payer: COMMERCIAL

## 2022-03-31 VITALS
BODY MASS INDEX: 39.8 KG/M2 | OXYGEN SATURATION: 97 % | SYSTOLIC BLOOD PRESSURE: 136 MMHG | DIASTOLIC BLOOD PRESSURE: 82 MMHG | HEART RATE: 102 BPM | WEIGHT: 278 LBS | TEMPERATURE: 97.2 F | HEIGHT: 70 IN

## 2022-03-31 DIAGNOSIS — E78.5 DYSLIPIDEMIA: ICD-10-CM

## 2022-03-31 DIAGNOSIS — R73.9 HYPERGLYCEMIA: ICD-10-CM

## 2022-03-31 DIAGNOSIS — Z71.82 EXERCISE COUNSELING: ICD-10-CM

## 2022-03-31 DIAGNOSIS — Z71.3 DIETARY COUNSELING: ICD-10-CM

## 2022-03-31 DIAGNOSIS — I10 HTN, GOAL BELOW 130/80: ICD-10-CM

## 2022-03-31 DIAGNOSIS — Z00.00 WELL ADULT EXAM: Primary | ICD-10-CM

## 2022-03-31 PROCEDURE — G8417 CALC BMI ABV UP PARAM F/U: HCPCS | Performed by: FAMILY MEDICINE

## 2022-03-31 PROCEDURE — 99395 PREV VISIT EST AGE 18-39: CPT | Performed by: FAMILY MEDICINE

## 2022-03-31 ASSESSMENT — PATIENT HEALTH QUESTIONNAIRE - PHQ9
SUM OF ALL RESPONSES TO PHQ QUESTIONS 1-9: 0
SUM OF ALL RESPONSES TO PHQ9 QUESTIONS 1 & 2: 0
1. LITTLE INTEREST OR PLEASURE IN DOING THINGS: 0
2. FEELING DOWN, DEPRESSED OR HOPELESS: 0

## 2022-03-31 NOTE — PATIENT INSTRUCTIONS
Learning About Obesity  What is obesity? Obesity means having an unhealthy amount of body fat. This puts your health in danger. It can lead to other health problems, such as type 2 diabetes and highblood pressure. How do you know if your weight is in the obesity range? To know if your weight is in the obesity range, your doctor looks at your bodymass index (BMI) and waist size. BMI is a number that is calculated from your weight and your height. To figure out your BMI for yourself, you can use an online tool, such as http://www.[employer]/ on [employer]. If your BMI is 30.0 or higher, it falls within the obesity range. Keep in mind that BMI and waist size are only guides. They are not tools to determine yourideal body weight. What causes obesity? When you take in more calories than you burn off, you gain weight. How you eat, how active you are, and other things affect how your body uses calories andwhether you gain weight. If you have family members who have too much body fat, you may have inherited a tendency to gain weight. And your family also helps form your eating andlifestyle habits, which can lead to obesity. Also, our busy lives make it harder to plan and cook healthy meals. For many of us, it's easier to reach for prepared foods, go out to eat, or go to the drive-through. But these foods are often high in saturated fat and calories. Portions are often too large. What can you do to reach a healthy weight? Focus on health, not diets. Diets are hard to stay on and don't work in the long run. It is very hard tostay with a diet that includes lots of big changes in your eating habits. Instead of a diet, focus on lifestyle changes that will improve your health and achieve the right balance of energy and calories. To lose weight, you need to burn more calories than you take in.  You can do it by eating healthy foods in reasonable amounts and becoming more active, even a little bit every day. Making small changes over time can add up to a lot. Make a plan for change. Many people have found that naming their reasons for change and staying focused on their plan can make a big difference. Work with your doctor tocreate a plan that is right for you.  Ask yourself: Radha Tello are my personal, most powerful reasons for wanting this change? What will my life look like when I've made the change? \"   Set your long-term goal. Make it specific, such as \"I will lose x pounds. \"  Marcus Sciara your long-term goal into smaller, short-term goals. Make these small steps specific and within your reach, things you know you can do. These steps are what keep you going from day to day. Talk with your doctor about other weight-loss options. If you have a BMI in a certain range and have not been able to lose weight with diet and exercise, medicine or surgery may be an option for you. Before your doctor will prescribe medicines or surgery, he or she will probably want you to be more active and follow your healthy eating plan for a period of time. These habits are key lifelong changes for managing your weight, with or without other medical treatment. And these changes can help you avoid weight-relatedhealth problems. How can you stay on your plan for change? Be ready. Choose to start during a time when there are few events like holidays, socialevents, and high-stress periods. These events might trigger slip-ups. Decide on your first few steps. Most people have more success when they make small changes, one step at a time. For example, you might switch a daily candy bar to a piece of fruit, walk10 minutes more, or add more vegetables to a meal.  Line up your support people. Make sure you're not going to be alone as you make this change. Connect with people who understand how important it is to you.  Ask family members and friends for help in keeping with your plan. And think about who could make itharder for you, and how to handle them. Try tracking. People who keep track of what they eat, feel, and do are better at losingweight. Try writing down things like:   What and how much you eat.  How you feel before and after each meal.   Details about each meal (like eating out or at home, eating alone, or with friends or family).  What you do to be active. Look and plan. As you track, look for patterns that you may want to change. Take note of:   When you eat and whether you skip meals.  How often you eat out.  How many fruits and vegetables you eat.  When you eat beyond feeling full.  When and why you eat for reasons other than being hungry. When you stray from your plan, don't get upset. Figure out what made you slipup and how you can fix it. Can you take medicines or have surgery to lose weight? If you have a BMI in a certain range and have not been able to lose weight withdiet and exercise, medicine or surgery may be an option for you. If you have a BMI of at least 30.0 (or a BMI of at least 27.0 and another health problem related to your weight), ask your doctor about weight-loss medicines. They work by making you feel less hungry, making you feel full more quickly, or changing how you digest fat. Medicines are used along with dietchanges and more physical activity to help you make lasting changes. If you have a BMI of 40.0 or more (or a BMI of 35.0 or more and another health problem related to your weight), your doctor may talk with you about surgery. Weight-loss surgery has risks, and you will need to work with your doctor tocompare the risk of having obesity with the risks of surgery. With any option you choose, you will still need to eat a healthy diet and getregular exercise. Follow-up care is a key part of your treatment and safety. Be sure to make and go to all appointments, and call your doctor if you are having problems.  It's also a good idea to know your test results and keep alist of the medicines you take. Where can you learn more? Go to [email or web address] and sign in to your [employer] account. Enter N111 in the [facility] box to learn more about \"Learning About Obesity. \"     If you do not have an account, please click on the \"Sign Up Now\" link. Current as of: December 27, 2021               Content Version: 13.2  © 3635-6236 Healthwise, Incorporated. Care instructions adapted under license by [facility] ([facility]). If you have questions about a medical condition or this instruction, always ask your healthcare professional. [address] any warranty or liability for your use of this information.

## 2022-03-31 NOTE — PROGRESS NOTES
Progress Note    Aurea Thomson is a 34 y.o.  male who presents today alone for evaluation of   Chief Complaint   Patient presents with    Annual Exam           HPI:   Patient is here for CPE today. Patient PMH HTN and asthma. Patient PSH T&A and wisdom tooth extraction. Patient fhx HTN dad and MGM HTN. Patient denies tobacco use. Patient admits to occasional EtOH consumption. Patient denies illicits. Patient denies SIG E CAPS. Patient denies SI/HI. Patient denies anxiety. Patient denies specific diet. Patient denies regular aerobic exercise. PHQ-9 Total Score: 0 (3/31/2022  7:25 AM)    Patient denies cp/sob/le edema/dizziness/lightheadedness/blurry va/ha. Patient denies PND/orthopnea. Health Maintenance Due   Topic Date Due    Hepatitis C screen  Never done    Varicella vaccine (1 of 2 - 2-dose childhood series) Never done    COVID-19 Vaccine (1) Never done    Pneumococcal 0-64 years Vaccine (1 of 2 - PPSV23) Never done    Flu vaccine (1) Never done        Current Medications:     Current Outpatient Medications   Medication Sig Dispense Refill    hydroCHLOROthiazide (HYDRODIURIL) 25 MG tablet One tablet by mouth every morning 90 tablet 1    losartan (COZAAR) 100 MG tablet Take one tablet by mouth daily 90 tablet 1    albuterol sulfate HFA (PROAIR HFA) 108 (90 BASE) MCG/ACT inhaler Inhale 2 puffs into the lungs every 6 hours as needed for Wheezing 1 Inhaler 3     No current facility-administered medications for this visit.        Allergies:   No Known Allergies     Medical History:     Past Medical History:   Diagnosis Date    Hypertension     Mild intermittent asthma without complication 6/15/2480    Tension headache 4/12/2018       Past Surgical History:   Procedure Laterality Date    TONSILLECTOMY AND ADENOIDECTOMY      WISDOM TOOTH EXTRACTION            Social History:     Social History     Socioeconomic History    Marital status:      Spouse name: Not on file    Number of children: Not on file    Years of education: Not on file    Highest education level: Not on file   Occupational History    Not on file   Tobacco Use    Smoking status: Never Smoker    Smokeless tobacco: Never Used   Vaping Use    Vaping Use: Never used   Substance and Sexual Activity    Alcohol use: Yes     Alcohol/week: 0.0 standard drinks     Comment: rare    Drug use: No    Sexual activity: Yes     Partners: Female   Other Topics Concern    Not on file   Social History Narrative    Not on file     Social Determinants of Health     Financial Resource Strain: Low Risk     Difficulty of Paying Living Expenses: Not hard at all   Food Insecurity: No Food Insecurity    Worried About [address] in the Last Year: Never true    [address] in the Last Year: Never true   Transportation Needs:     Lack of Transportation (Medical): Not on file    Lack of Transportation (Non-Medical): Not on file   Physical Activity:     Days of Exercise per Week: Not on file    Minutes of Exercise per Session: Not on file   Stress:     Feeling of Stress : Not on file   Social Connections:     Frequency of Communication with Friends and Family: Not on file    Frequency of Social Gatherings with Friends and Family: Not on file    Attends Orthodoxy Services: Not on file    Active Member of [address] [place] [address] or Organizations: Not on file    Attends Club or Organization Meetings: Not on file    Marital Status: Not on file   Intimate Partner Violence:     Fear of Current or Ex-Partner: Not on file    Emotionally Abused: Not on file    Physically Abused: Not on file    Sexually Abused: Not on file   Housing Stability:     Unable to Pay for Housing in the Last Year: Not on file    Number of [place] in the Last Year: Not on file    Unstable Housing in the Last Year: Not on file        ROS:     Constitutional: No fevers, chills, fatigue.   ENT: No nasal congestion or sore throat  Respiratory: No difficulty in breathing or cough. Cardiovascular: No chest pain, palpitations or shortness of breath  Gastrointestinal: No abdominal pain or change in bowel movements. Genitourinary: No change in urinary frequency or dysuria. Skin: No rashes or skin lesions. Neurological: No weakness. No headaches. Last Filed Vitals:  /82 (Site: Left Upper Arm, Position: Sitting, Cuff Size: Large Adult)   Pulse 102   Temp 97.2 °F (36.2 °C) (Temporal)   Ht 5' 10\" (1.778 m)   Wt 278 lb (126.1 kg)   SpO2 97%   BMI 39.89 kg/m²      Physical Examination:     GENERAL APPEARANCE: in no acute distress, well developed, well nourished. HEAD: normocephalic, atraumatic. EYES: extraocular movement intact (EOMI), pupils equal, round, reactive to light and accommodation. EARS: normal, tympanic membrane intact, clear, auditory canal clear. NOSE: nares patent, no erythema, sinuses nontender bilaterally, no rhinorrhea. ORAL CAVITY: mucosa moist, no lesions. THROAT: clear, no mass, no exudate. NECK/THYROID: neck supple, full range of motion, no thyromegaly. HEART: no murmurs, regular rate and rhythm, S1, S2 normal.   LUNGS: clear to auscultation bilaterally, no wheezes, rales, rhonchi.    ABDOMEN: normal, bowel sounds present, soft, nontender, nondistended, no rebound guarding or rigidity    Recent Labs/ In Office Testing/ Radiograph review:     Hospital Outpatient Visit on 03/25/2022   Component Date Value Ref Range Status    TSH 03/25/2022 0.89  0.30 - 5.00 mIU/L Final    Glucose 03/25/2022 94  70 - 99 mg/dL Final    BUN 03/25/2022 13  6 - 20 mg/dL Final    CREATININE 03/25/2022 0.83  0.70 - 1.20 mg/dL Final    Calcium 03/25/2022 9.8  8.6 - 10.4 mg/dL Final    Albumin 03/25/2022 4.8  3.5 - 5.2 g/dL Final    Phosphorus 03/25/2022 3.0  2.5 - 4.5 mg/dL Final    Sodium 03/25/2022 145* 135 - 144 mmol/L Final    Potassium 03/25/2022 3.9  3.7 - 5.3 mmol/L Final    Chloride 03/25/2022 103  98 - 107 mmol/L Final    CO2 03/25/2022 24  20 - 31 mmol/L Final    Anion Gap 03/25/2022 18* 9 - 17 mmol/L Final    GFR Non- 03/25/2022 >60  >60 mL/min Final    GFR  03/25/2022 >60  >60 mL/min Final    GFR Comment 03/25/2022        Final    Comment: Average GFR for 30-36 years old:   80 mL/min/1.73sq m  Chronic Kidney Disease:   <60 mL/min/1.73sq m  Kidney failure:   <15 mL/min/1.73sq m              eGFR calculated using average adult body mass. Additional eGFR calculator available at:        [employer]            Magnesium 03/25/2022 2.1  1.6 - 2.6 mg/dL Final    Cholesterol 03/25/2022 191  <200 mg/dL Final    Comment:    Cholesterol Guidelines:      <200  Desirable   200-240  Borderline      >240  Undesirable         HDL 03/25/2022 44  >40 mg/dL Final    Comment:    HDL Guidelines:    <40     Undesirable   40-59    Borderline    >59     Desirable         LDL Cholesterol 03/25/2022 112  0 - 130 mg/dL Final    Comment:    LDL Guidelines:     <100    Desirable   100-129   Near to/above Desirable   130-159   Borderline      >159   Undesirable     Direct (measured) LDL and calculated LDL are not interchangeable tests.  Chol/HDL Ratio 03/25/2022 4.3  <5 Final            Triglycerides 03/25/2022 177* <150 mg/dL Final    Comment:    Triglyceride Guidelines:     <150   Desirable   150-199  Borderline   200-499  High     >499   Very high   Based on AHA Guidelines for fasting triglyceride, October 2012.  Hemoglobin A1C 03/25/2022 5.6  4.0 - 6.0 % Final    Estimated Avg Glucose 03/25/2022 114  mg/dL Final    Comment: The ADA and AACC recommend providing the estimated average glucose result to permit better   patient understanding of their HBA1c result.       WBC 03/25/2022 6.9  3.5 - 11.3 k/uL Final    RBC 03/25/2022 4.90  4.21 - 5.77 m/uL Final    Hemoglobin 03/25/2022 13.4  13.0 - 17.0 g/dL Final    Hematocrit 03/25/2022 41.7  40.7 - 50.3 % Final    MCV 03/25/2022 85.1  82.6 - 102.9 fL Final    MCH 03/25/2022 27.3  25.2 - 33.5 pg Final    MCHC 03/25/2022 32.1  28.4 - 34.8 g/dL Final    RDW 03/25/2022 13.7  11.8 - 14.4 % Final    Platelets 29/96/8060 334  138 - 453 k/uL Final    MPV 03/25/2022 9.9  8.1 - 13.5 fL Final    NRBC Automated 03/25/2022 0.0  0.0 per 100 WBC Final    Seg Neutrophils 03/25/2022 56  36 - 65 % Final    Lymphocytes 03/25/2022 33  24 - 43 % Final    Monocytes 03/25/2022 8  3 - 12 % Final    Eosinophils % 03/25/2022 2  1 - 4 % Final    Basophils 03/25/2022 1  0 - 2 % Final    Immature Granulocytes 03/25/2022 0  0 % Final    Segs Absolute 03/25/2022 3.88  1.50 - 8.10 k/uL Final    Absolute Lymph # 03/25/2022 2.29  1.10 - 3.70 k/uL Final    Absolute Mono # 03/25/2022 0.52  0.10 - 1.20 k/uL Final    Absolute Eos # 03/25/2022 0.11  0.00 - 0.44 k/uL Final    Basophils Absolute 03/25/2022 0.04  0.00 - 0.20 k/uL Final    Absolute Immature Granulocyte 03/25/2022 <0.03  0.00 - 0.30 k/uL Final    AST 03/25/2022 18  <40 U/L Final    ALT 03/25/2022 33  5 - 41 U/L Final       No results found for this visit on 03/31/22. Assessment/Plan:     Angel Lim was seen today for annual exam.    Diagnoses and all orders for this visit:    Well adult exam    BMI 39.0-39.9,adult    Exercise counseling    Dietary counseling  -      BMI ABOVE NORMAL F/U    HTN, goal below 130/80  -     Magnesium; Future  -     Renal Function Panel; Future    Dyslipidemia  -     ALT; Future  -     AST; Future  -     CBC with Auto Differential; Future  -     Lipid Panel; Future  -     TSH with Reflex; Future    Hyperglycemia  -     Hemoglobin A1C; Future    Follow up on labs. Encouraged 150 mins of aerobic activity daily. Encouraged low carb diet. Refills as above. Asked patient to check his BP at home and log. All questions answered and addressed to patient satisfaction. Patient understands and agrees to the plan.      The patient was evaluated and treated today based on the osteopathic principle that each person is a unit of body, mind, and spirit, the body is capable of self-regulation, self-healing, and health maintenance and that structure and function are reciprocally interrelated. Follow-up:   Return in about 6 months (around 9/30/2022) for htn; 20 min appt. Melissa Callahan D.O.      BMI was elevated today, and weight loss plan recommended is : conventional weight loss.

## 2022-10-03 ENCOUNTER — OFFICE VISIT (OUTPATIENT)
Dept: FAMILY MEDICINE CLINIC | Age: 32
End: 2022-10-03
Payer: COMMERCIAL

## 2022-10-03 VITALS
HEART RATE: 84 BPM | TEMPERATURE: 97.4 F | DIASTOLIC BLOOD PRESSURE: 84 MMHG | BODY MASS INDEX: 39.22 KG/M2 | SYSTOLIC BLOOD PRESSURE: 128 MMHG | WEIGHT: 274 LBS | HEIGHT: 70 IN | OXYGEN SATURATION: 97 %

## 2022-10-03 DIAGNOSIS — I10 HTN, GOAL BELOW 130/80: Primary | ICD-10-CM

## 2022-10-03 DIAGNOSIS — E78.5 DYSLIPIDEMIA: ICD-10-CM

## 2022-10-03 PROBLEM — G44.209 TENSION HEADACHE: Status: RESOLVED | Noted: 2018-04-12 | Resolved: 2022-10-03

## 2022-10-03 PROCEDURE — 99214 OFFICE O/P EST MOD 30 MIN: CPT | Performed by: NURSE PRACTITIONER

## 2022-10-03 SDOH — ECONOMIC STABILITY: FOOD INSECURITY: WITHIN THE PAST 12 MONTHS, THE FOOD YOU BOUGHT JUST DIDN'T LAST AND YOU DIDN'T HAVE MONEY TO GET MORE.: NEVER TRUE

## 2022-10-03 SDOH — ECONOMIC STABILITY: FOOD INSECURITY: WITHIN THE PAST 12 MONTHS, YOU WORRIED THAT YOUR FOOD WOULD RUN OUT BEFORE YOU GOT MONEY TO BUY MORE.: NEVER TRUE

## 2022-10-03 ASSESSMENT — PATIENT HEALTH QUESTIONNAIRE - PHQ9
SUM OF ALL RESPONSES TO PHQ9 QUESTIONS 1 & 2: 0
SUM OF ALL RESPONSES TO PHQ QUESTIONS 1-9: 0
2. FEELING DOWN, DEPRESSED OR HOPELESS: 0
1. LITTLE INTEREST OR PLEASURE IN DOING THINGS: 0
SUM OF ALL RESPONSES TO PHQ QUESTIONS 1-9: 0

## 2022-10-03 ASSESSMENT — SOCIAL DETERMINANTS OF HEALTH (SDOH): HOW HARD IS IT FOR YOU TO PAY FOR THE VERY BASICS LIKE FOOD, HOUSING, MEDICAL CARE, AND HEATING?: NOT HARD AT ALL

## 2022-10-03 NOTE — PROGRESS NOTES
[facility]  [id number] [facility]. [place] [address]  [phone]  [phone]    Dedrick Wong is a 34 y.o. male who is here with c/o of:    Chief Complaint: Establish Care (Previous Dr Yulia Guillory)      Patient Accompanied by: n/a    HPI - Dedrick Wong is here today with c/o:    Patient here to establish care. Previous PCP:  : Yes  Children: No  Employed:  and   Exercise: No  Diet: Tries to eat a balanced diet  Smoker: No  Alcohol: Socially/Daily  Sleep: Averages 6-8 hours    Chronic Conditions:    Hypertension  Asthma    Specialists:    None    Health Concerns:    None    Health Maintenance Due   Topic Date Due    COVID-19 Vaccine (1) Never done    Varicella vaccine (1 of 2 - 2-dose childhood series) Never done    Pneumococcal 0-64 years Vaccine (1 - PCV) Never done    Hepatitis C screen  Never done    Flu vaccine (1) Never done        Patient Active Problem List:     Elevated blood pressure reading     Benign essential HTN     Mild intermittent asthma without complication     Tension headache     Past Medical History:   Diagnosis Date    Hypertension     Mild intermittent asthma without complication 1/21/5283    Tension headache 4/12/2018      Past Surgical History:   Procedure Laterality Date    TONSILLECTOMY AND ADENOIDECTOMY      WISDOM TOOTH EXTRACTION       Family History   Problem Relation Age of Onset    Hypertension Father     Heart Disease Father     Hypertension Maternal Grandmother     Other Maternal Grandmother         neuropathy    Other Paternal Grandfather         brain aneurysm     Social History     Tobacco Use    Smoking status: Never    Smokeless tobacco: Never   Substance Use Topics    Alcohol use:  Yes     Alcohol/week: 0.0 standard drinks     Comment: rare     ALLERGIES:  No Known Allergies       Subjective   Review of Systems   Constitutional:  Negative for activity change, appetite change,unexpected weight change, chills, fever, and fatigue. HENT: Negative for ear pain, sore throat,  Rhinorrhea, sinus pain, sinus pressure, congestion. Eyes:  Negative for pain and discharge. Respiratory:  Negative for chest tightness, shortness of breath, wheezing, and cough. Cardiovascular:  Negative for chest pain, palpitations and leg swelling. Gastrointestinal: Negative for abdominal pain, blood in stool, constipation,diarrhea, nausea and vomiting. Endocrine: Negative for cold intolerance, heat intolerance, polydipsia, polyphagia and polyuria. Genitourinary: Negative for difficulty urinating, dysuria, flank pain, frequency, hematuria and urgency. Musculoskeletal: Negative for arthralgias, back pain, joint swelling, myalgias, neck pain and neck stiffness. Skin: Negative for rash and wound. Allergic/Immunologic: Negative for environmental allergies and food allergies. Neurological:  Negative for dizziness, light-headedness, numbness and headaches. Hematological:  Negative for adenopathy. Does not bruise/bleed easily. Psychiatric/Behavioral: Negative for self-injury, sleep disturbance and suicidal ideas. Objective   Physical Exam   PHYSICAL EXAM:   Constitutional: Cammy Conn is oriented to person, place, and time. Vital signs are normal. Appears well-developed and well-nourished. He is obese  Head: Normocephalic and atraumatic. Eyes:PERRL, EOMI, Conjunctiva normal, No discharge. Neck: Full passive range of motion. Non-tender on palpation. Neck supple. No thyromegaly present. Trachea normal.  Cardiovascular: Normal rate, regular rhythm, S1, S2, no murmur, no gallop, no friction rub, intact distal pulses. Pulmonary/Chest: Breath sounds are clear throughout, No respiratory distress, No wheezing, No chest tenderness. Effort normal  Abdominal: Soft. Normal appearance, bowel sounds are present and normoactive. There is no hepatosplenomegaly. There is no tenderness. There is no CVA tenderness.    Musculoskeletal: Extremities appear regular and symmetric. No evident masses, lesions, foreign bodies, or other abnormalities. No edema. No tenderness on palpation. Joints are stable. Full ROM, strength and tone are within normal limits. Neurological: Alert and oriented to person, place, and time. Normal motor function, Normal sensory function, No focal deficits noted. He has normal strength. Skin: Skin is warm, dry and intact. No obvious lesions on exposed skin  Psychiatric: Normal mood and affect. Speech is normal and behavior is normal.     Nursing note and vitals reviewed. Blood pressure 128/84, pulse 84, temperature 97.4 °F (36.3 °C), temperature source Temporal, height 5' 10\" (1.778 m), weight 274 lb (124.3 kg), SpO2 97 %. Body mass index is 39.31 kg/m².     Wt Readings from Last 3 Encounters:   10/03/22 274 lb (124.3 kg)   03/31/22 278 lb (126.1 kg)   08/12/21 272 lb 9.6 oz (123.7 kg)     BP Readings from Last 3 Encounters:   10/03/22 128/84   03/31/22 136/82   08/12/21 130/76       Hospital Outpatient Visit on 03/25/2022   Component Date Value Ref Range Status    TSH 03/25/2022 0.89  0.30 - 5.00 mIU/L Final    Glucose 03/25/2022 94  70 - 99 mg/dL Final    BUN 03/25/2022 13  6 - 20 mg/dL Final    Creatinine 03/25/2022 0.83  0.70 - 1.20 mg/dL Final    Calcium 03/25/2022 9.8  8.6 - 10.4 mg/dL Final    Albumin 03/25/2022 4.8  3.5 - 5.2 g/dL Final    Phosphorus 03/25/2022 3.0  2.5 - 4.5 mg/dL Final    Sodium 03/25/2022 145 (A)  135 - 144 mmol/L Final    Potassium 03/25/2022 3.9  3.7 - 5.3 mmol/L Final    Chloride 03/25/2022 103  98 - 107 mmol/L Final    CO2 03/25/2022 24  20 - 31 mmol/L Final    Anion Gap 03/25/2022 18 (A)  9 - 17 mmol/L Final    GFR Non- 03/25/2022 >60  >60 mL/min Final    GFR  03/25/2022 >60  >60 mL/min Final    GFR Comment 03/25/2022        Final    Comment: Average GFR for 2739 years old:   107 mL/min/1.73sq m  Chronic Kidney Disease:   <60 mL/min/1.73sq m  Kidney failure:   <15 mL/min/1.73sq m              eGFR calculated using average adult body mass. Additional eGFR calculator available at:        myJambi.br            Magnesium 03/25/2022 2.1  1.6 - 2.6 mg/dL Final    Cholesterol 03/25/2022 191  <200 mg/dL Final    Comment:    Cholesterol Guidelines:      <200  Desirable   200-240  Borderline      >240  Undesirable         HDL 03/25/2022 44  >40 mg/dL Final    Comment:    HDL Guidelines:    <40     Undesirable   40-59    Borderline    >59     Desirable         LDL Cholesterol 03/25/2022 112  0 - 130 mg/dL Final    Comment:    LDL Guidelines:     <100    Desirable   100-129   Near to/above Desirable   130-159   Borderline      >159   Undesirable     Direct (measured) LDL and calculated LDL are not interchangeable tests. Chol/HDL Ratio 03/25/2022 4.3  <5 Final            Triglycerides 03/25/2022 177 (A)  <150 mg/dL Final    Comment:    Triglyceride Guidelines:     <150   Desirable   150-199  Borderline   200-499  High     >499   Very high   Based on AHA Guidelines for fasting triglyceride, October 2012. Hemoglobin A1C 03/25/2022 5.6  4.0 - 6.0 % Final    Estimated Avg Glucose 03/25/2022 114  mg/dL Final    Comment: The ADA and AACC recommend providing the estimated average glucose result to permit better   patient understanding of their HBA1c result.       WBC 03/25/2022 6.9  3.5 - 11.3 k/uL Final    RBC 03/25/2022 4.90  4.21 - 5.77 m/uL Final    Hemoglobin 03/25/2022 13.4  13.0 - 17.0 g/dL Final    Hematocrit 03/25/2022 41.7  40.7 - 50.3 % Final    MCV 03/25/2022 85.1  82.6 - 102.9 fL Final    MCH 03/25/2022 27.3  25.2 - 33.5 pg Final    MCHC 03/25/2022 32.1  28.4 - 34.8 g/dL Final    RDW 03/25/2022 13.7  11.8 - 14.4 % Final    Platelets 50/30/4192 334  138 - 453 k/uL Final    MPV 03/25/2022 9.9  8.1 - 13.5 fL Final    NRBC Automated 03/25/2022 0.0  0.0 per 100 WBC Final    Seg Neutrophils 03/25/2022 56  36 - 65 % Final Lymphocytes 03/25/2022 33  24 - 43 % Final    Monocytes 03/25/2022 8  3 - 12 % Final    Eosinophils % 03/25/2022 2  1 - 4 % Final    Basophils 03/25/2022 1  0 - 2 % Final    Immature Granulocytes 03/25/2022 0  0 % Final    Segs Absolute 03/25/2022 3.88  1.50 - 8.10 k/uL Final    Absolute Lymph # 03/25/2022 2.29  1.10 - 3.70 k/uL Final    Absolute Mono # 03/25/2022 0.52  0.10 - 1.20 k/uL Final    Absolute Eos # 03/25/2022 0.11  0.00 - 0.44 k/uL Final    Basophils Absolute 03/25/2022 0.04  0.00 - 0.20 k/uL Final    Absolute Immature Granulocyte 03/25/2022 <0.03  0.00 - 0.30 k/uL Final    AST 03/25/2022 18  <40 U/L Final    ALT 03/25/2022 33  5 - 41 U/L Final     No results found for this visit on 10/03/22. Completed Orders/Prescriptions   No orders of the defined types were placed in this encounter. AssessmentPlan/Medical Decision Making     1. HTN, goal below 130/80    - Stable  - Continue Losartan 100 mg daily  - Continue HCTZ 25 mg daily  - CBC with Auto Differential; Future  - Comprehensive Metabolic Panel; Future  - TSH with Reflex; Future  - Lipid, Fasting; Future    2. Dyslipidemia    - CBC with Auto Differential; Future  - Comprehensive Metabolic Panel; Future  - TSH with Reflex; Future  - Lipid, Fasting; Future    3. BMI 39.0-39.9,adult    - CBC with Auto Differential; Future  - Comprehensive Metabolic Panel; Future  - TSH with Reflex; Future  - Lipid, Fasting; Future    Return in about 6 months (around 4/3/2023) for chronic conditions. 1.  Thu Hamilton received counseling on the following healthy behaviors: nutrition, exercise, and medication adherence  2. Patient given educational materials - see patient instructions  3. Was a self-tracking handout given in paper form or via [employer]? No  If yes, see orders or list here. 4.  Discussed use, benefit, and side effects of prescribed medications. Barriers to medication compliance addressed. All patient questions answered.   Pt voiced understanding. 5.  Reviewed prior labs, imaging, consultation, follow up, and health maintenance  6. Continue current medications, diet and exercise. 7. Discussed use, benefit, and side effects of prescribed medications. Barriers to medication compliance addressed. All her questions were answered. Pt voiced understanding. Brigid Hedrick will continue current medications, diet and exercise. Of the  30  minute duration appointment visit, Jun Negron CNP spent at least 50% of the face-to-face time in counseling, explanation of diagnosis, planning of further management, and answering all questions.           Signed:  Jun Negron CNP

## 2022-11-22 DIAGNOSIS — I10 HTN, GOAL BELOW 130/80: ICD-10-CM

## 2022-11-23 RX ORDER — LOSARTAN POTASSIUM 100 MG/1
TABLET ORAL
Qty: 90 TABLET | Refills: 1 | Status: SHIPPED | OUTPATIENT
Start: 2022-11-23

## 2022-11-23 RX ORDER — HYDROCHLOROTHIAZIDE 25 MG/1
TABLET ORAL
Qty: 90 TABLET | Refills: 1 | Status: SHIPPED | OUTPATIENT
Start: 2022-11-23

## 2023-04-24 ENCOUNTER — HOSPITAL ENCOUNTER (OUTPATIENT)
Age: 33
Setting detail: SPECIMEN
Discharge: HOME | End: 2023-04-24

## 2023-04-24 DIAGNOSIS — E78.5 DYSLIPIDEMIA: ICD-10-CM

## 2023-04-24 DIAGNOSIS — I10 HTN, GOAL BELOW 130/80: ICD-10-CM

## 2023-04-24 LAB
ABSOLUTE EOS #: 0.16 K/UL (ref 0–0.44)
ABSOLUTE IMMATURE GRANULOCYTE: <0.03 K/UL (ref 0–0.3)
ABSOLUTE LYMPH #: 2.13 K/UL (ref 1.1–3.7)
ABSOLUTE MONO #: 0.45 K/UL (ref 0.1–1.2)
ALBUMIN SERPL-MCNC: 4.7 G/DL (ref 3.5–5.2)
ALBUMIN/GLOBULIN RATIO: 2 (ref 1–2.5)
ALP SERPL-CCNC: 62 U/L (ref 40–129)
ALT SERPL-CCNC: 15 U/L (ref 5–41)
ANION GAP SERPL CALCULATED.3IONS-SCNC: 10 MMOL/L (ref 9–17)
AST SERPL-CCNC: 12 U/L
BASOPHILS # BLD: 1 % (ref 0–2)
BASOPHILS ABSOLUTE: 0.03 K/UL (ref 0–0.2)
BILIRUB SERPL-MCNC: 0.5 MG/DL (ref 0.3–1.2)
BUN SERPL-MCNC: 8 MG/DL (ref 6–20)
CALCIUM SERPL-MCNC: 9.8 MG/DL (ref 8.6–10.4)
CHLORIDE SERPL-SCNC: 104 MMOL/L (ref 98–107)
CHOLEST SERPL-MCNC: 194 MG/DL
CHOLESTEROL/HDL RATIO: 3.4
CO2 SERPL-SCNC: 28 MMOL/L (ref 20–31)
CREAT SERPL-MCNC: 0.82 MG/DL (ref 0.7–1.2)
EOSINOPHILS RELATIVE PERCENT: 3 % (ref 1–4)
GFR SERPL CREATININE-BSD FRML MDRD: >60 ML/MIN/1.73M2
GLUCOSE SERPL-MCNC: 95 MG/DL (ref 70–99)
HCT VFR BLD AUTO: 41.2 % (ref 40.7–50.3)
HDLC SERPL-MCNC: 57 MG/DL
HGB BLD-MCNC: 13.3 G/DL (ref 13–17)
IMMATURE GRANULOCYTES: 0 %
LDLC SERPL CALC-MCNC: 124 MG/DL (ref 0–130)
LYMPHOCYTES # BLD: 37 % (ref 24–43)
MCH RBC QN AUTO: 27.3 PG (ref 25.2–33.5)
MCHC RBC AUTO-ENTMCNC: 32.3 G/DL (ref 28.4–34.8)
MCV RBC AUTO: 84.6 FL (ref 82.6–102.9)
MONOCYTES # BLD: 8 % (ref 3–12)
NRBC AUTOMATED: 0 PER 100 WBC
PDW BLD-RTO: 15 % (ref 11.8–14.4)
PLATELET # BLD AUTO: 333 K/UL (ref 138–453)
PMV BLD AUTO: 10.1 FL (ref 8.1–13.5)
POTASSIUM SERPL-SCNC: 3.9 MMOL/L (ref 3.7–5.3)
PROT SERPL-MCNC: 7.1 G/DL (ref 6.4–8.3)
RBC # BLD: 4.87 M/UL (ref 4.21–5.77)
RBC # BLD: ABNORMAL 10*6/UL
SEG NEUTROPHILS: 51 % (ref 36–65)
SEGMENTED NEUTROPHILS ABSOLUTE COUNT: 2.96 K/UL (ref 1.5–8.1)
SODIUM SERPL-SCNC: 142 MMOL/L (ref 135–144)
TRIGL SERPL-MCNC: 65 MG/DL
TSH SERPL-ACNC: 0.84 UIU/ML (ref 0.3–5)
WBC # BLD AUTO: 5.7 K/UL (ref 3.5–11.3)

## 2023-04-25 SDOH — ECONOMIC STABILITY: FOOD INSECURITY: WITHIN THE PAST 12 MONTHS, THE FOOD YOU BOUGHT JUST DIDN'T LAST AND YOU DIDN'T HAVE MONEY TO GET MORE.: PATIENT DECLINED

## 2023-04-25 SDOH — ECONOMIC STABILITY: INCOME INSECURITY: HOW HARD IS IT FOR YOU TO PAY FOR THE VERY BASICS LIKE FOOD, HOUSING, MEDICAL CARE, AND HEATING?: PATIENT DECLINED

## 2023-04-25 SDOH — ECONOMIC STABILITY: FOOD INSECURITY: WITHIN THE PAST 12 MONTHS, YOU WORRIED THAT YOUR FOOD WOULD RUN OUT BEFORE YOU GOT MONEY TO BUY MORE.: PATIENT DECLINED

## 2023-04-25 SDOH — ECONOMIC STABILITY: TRANSPORTATION INSECURITY
IN THE PAST 12 MONTHS, HAS LACK OF TRANSPORTATION KEPT YOU FROM MEETINGS, WORK, OR FROM GETTING THINGS NEEDED FOR DAILY LIVING?: PATIENT DECLINED

## 2023-04-25 SDOH — ECONOMIC STABILITY: HOUSING INSECURITY
IN THE LAST 12 MONTHS, WAS THERE A TIME WHEN YOU DID NOT HAVE A STEADY PLACE TO SLEEP OR SLEPT IN A SHELTER (INCLUDING NOW)?: PATIENT REFUSED

## 2023-04-27 ENCOUNTER — OFFICE VISIT (OUTPATIENT)
Dept: FAMILY MEDICINE CLINIC | Age: 33
End: 2023-04-27
Payer: COMMERCIAL

## 2023-04-27 VITALS
OXYGEN SATURATION: 98 % | WEIGHT: 227.4 LBS | SYSTOLIC BLOOD PRESSURE: 130 MMHG | TEMPERATURE: 97.9 F | BODY MASS INDEX: 32.56 KG/M2 | HEART RATE: 78 BPM | HEIGHT: 70 IN | DIASTOLIC BLOOD PRESSURE: 80 MMHG

## 2023-04-27 DIAGNOSIS — J45.20 MILD INTERMITTENT ASTHMA WITHOUT COMPLICATION: ICD-10-CM

## 2023-04-27 DIAGNOSIS — I10 HTN, GOAL BELOW 130/80: Primary | ICD-10-CM

## 2023-04-27 DIAGNOSIS — E78.5 DYSLIPIDEMIA: ICD-10-CM

## 2023-04-27 PROCEDURE — 3079F DIAST BP 80-89 MM HG: CPT | Performed by: NURSE PRACTITIONER

## 2023-04-27 PROCEDURE — 3075F SYST BP GE 130 - 139MM HG: CPT | Performed by: NURSE PRACTITIONER

## 2023-04-27 PROCEDURE — 99214 OFFICE O/P EST MOD 30 MIN: CPT | Performed by: NURSE PRACTITIONER

## 2023-04-27 RX ORDER — LOSARTAN POTASSIUM 50 MG/1
TABLET ORAL
Qty: 90 TABLET | Refills: 0 | Status: SHIPPED
Start: 2023-04-27

## 2023-04-27 SDOH — ECONOMIC STABILITY: INCOME INSECURITY: HOW HARD IS IT FOR YOU TO PAY FOR THE VERY BASICS LIKE FOOD, HOUSING, MEDICAL CARE, AND HEATING?: NOT HARD AT ALL

## 2023-04-27 SDOH — ECONOMIC STABILITY: FOOD INSECURITY: WITHIN THE PAST 12 MONTHS, THE FOOD YOU BOUGHT JUST DIDN'T LAST AND YOU DIDN'T HAVE MONEY TO GET MORE.: NEVER TRUE

## 2023-04-27 SDOH — ECONOMIC STABILITY: HOUSING INSECURITY
IN THE LAST 12 MONTHS, WAS THERE A TIME WHEN YOU DID NOT HAVE A STEADY PLACE TO SLEEP OR SLEPT IN A SHELTER (INCLUDING NOW)?: NO

## 2023-04-27 SDOH — ECONOMIC STABILITY: FOOD INSECURITY: WITHIN THE PAST 12 MONTHS, YOU WORRIED THAT YOUR FOOD WOULD RUN OUT BEFORE YOU GOT MONEY TO BUY MORE.: NEVER TRUE

## 2023-04-27 ASSESSMENT — PATIENT HEALTH QUESTIONNAIRE - PHQ9
SUM OF ALL RESPONSES TO PHQ QUESTIONS 1-9: 0
SUM OF ALL RESPONSES TO PHQ QUESTIONS 1-9: 0
1. LITTLE INTEREST OR PLEASURE IN DOING THINGS: 0
SUM OF ALL RESPONSES TO PHQ QUESTIONS 1-9: 0
SUM OF ALL RESPONSES TO PHQ9 QUESTIONS 1 & 2: 0
2. FEELING DOWN, DEPRESSED OR HOPELESS: 0
SUM OF ALL RESPONSES TO PHQ QUESTIONS 1-9: 0

## 2023-04-27 NOTE — PROGRESS NOTES
[facility] [address]  [id number] Baldev Ivory, [place] [address]  [phone]  [phone]    Karen Beal is a 28 y.o. male who is here with c/o of:    Chief Complaint: Hypertension and Follow-up      Patient Accompanied by: n/a    HPI - Karen Beal is here today with c/o:    Patient here for re evaluation of chronic conditions. HTN-  Recently lost 50 lbs in the last 6 months. He has been cutting his bp medications in half due to dizziness. He says that he was trying to exercise due to low blood pressure he has not. He has been checking blood pressures on the half dose and averaging 106/60-70's. Asthma-  Controlled. Does not use albuterol. Health Maintenance Due   Topic Date Due    COVID-19 Vaccine (1) Never done    Varicella vaccine (1 of 2 - 2-dose childhood series) Never done    Pneumococcal 0-64 years Vaccine (1 - PCV) Never done        Patient Active Problem List:     HTN, goal below 130/80     Mild intermittent asthma without complication     Dyslipidemia     BMI 39.0-39.9,adult     Past Medical History:   Diagnosis Date    Hypertension     Mild intermittent asthma without complication 9/06/9716    Tension headache 4/12/2018      Past Surgical History:   Procedure Laterality Date    TONSILLECTOMY AND ADENOIDECTOMY      WISDOM TOOTH EXTRACTION       Family History   Problem Relation Age of Onset    Hypertension Father     Heart Disease Father     Hypertension Maternal Grandmother     Other Maternal Grandmother         neuropathy    Other Paternal Grandfather         brain aneurysm     Social History     Tobacco Use    Smoking status: Never    Smokeless tobacco: Never   Substance Use Topics    Alcohol use: Yes     Alcohol/week: 0.0 standard drinks     Comment: rare     ALLERGIES:  No Known Allergies       Subjective   Review of Systems   Neurological:  Positive for dizziness and light-headedness. All other systems reviewed and are negative.

## 2023-07-27 ENCOUNTER — OFFICE VISIT (OUTPATIENT)
Dept: FAMILY MEDICINE CLINIC | Age: 33
End: 2023-07-27
Payer: COMMERCIAL

## 2023-07-27 VITALS
TEMPERATURE: 97.1 F | SYSTOLIC BLOOD PRESSURE: 126 MMHG | WEIGHT: 227.2 LBS | DIASTOLIC BLOOD PRESSURE: 92 MMHG | OXYGEN SATURATION: 99 % | BODY MASS INDEX: 32.6 KG/M2 | HEART RATE: 71 BPM

## 2023-07-27 DIAGNOSIS — J45.20 MILD INTERMITTENT ASTHMA WITHOUT COMPLICATION: ICD-10-CM

## 2023-07-27 DIAGNOSIS — E78.5 DYSLIPIDEMIA: ICD-10-CM

## 2023-07-27 DIAGNOSIS — I10 HTN, GOAL BELOW 130/80: Primary | ICD-10-CM

## 2023-07-27 PROCEDURE — 3074F SYST BP LT 130 MM HG: CPT | Performed by: NURSE PRACTITIONER

## 2023-07-27 PROCEDURE — 99214 OFFICE O/P EST MOD 30 MIN: CPT | Performed by: NURSE PRACTITIONER

## 2023-07-27 PROCEDURE — 3080F DIAST BP >= 90 MM HG: CPT | Performed by: NURSE PRACTITIONER

## 2023-07-27 ASSESSMENT — PATIENT HEALTH QUESTIONNAIRE - PHQ9
SUM OF ALL RESPONSES TO PHQ QUESTIONS 1-9: 0
SUM OF ALL RESPONSES TO PHQ QUESTIONS 1-9: 0
1. LITTLE INTEREST OR PLEASURE IN DOING THINGS: 0
SUM OF ALL RESPONSES TO PHQ QUESTIONS 1-9: 0
SUM OF ALL RESPONSES TO PHQ9 QUESTIONS 1 & 2: 0
SUM OF ALL RESPONSES TO PHQ QUESTIONS 1-9: 0
2. FEELING DOWN, DEPRESSED OR HOPELESS: 0

## 2023-07-27 NOTE — PROGRESS NOTES
Rosalba Rose, [address]  [address] [facility]. Karena Zhou, [address]  [phone]  [phone]    Tiburcio Gillis is a 28 y.o. male who is here with c/o of:    Chief Complaint: Hypertension (No new concerns per pt)      Patient Accompanied by: n/a    HPI - Tiburcio Gillis is here today with c/o:    Patient here for re evaluation of chronic conditions. HTN-  No medication at this time. Continues to eat healthy and exercise. He has been monitoring his blood sugars and averaging 110-120/70-80's. Denies chest pain, dizziness, shortness of breath, headaches or swelling     Asthma-  Controlled. Does not use albuterol. Health Maintenance Due   Topic Date Due    COVID-19 Vaccine (1) Never done    Varicella vaccine (1 of 2 - 2-dose childhood series) Never done    Pneumococcal 0-64 years Vaccine (1 - PCV) Never done        Patient Active Problem List:     HTN, goal below 130/80     Mild intermittent asthma without complication     Dyslipidemia     Past Medical History:   Diagnosis Date    Hypertension     Mild intermittent asthma without complication 9/41/8580    Tension headache 4/12/2018      Past Surgical History:   Procedure Laterality Date    TONSILLECTOMY AND ADENOIDECTOMY      WISDOM TOOTH EXTRACTION       Family History   Problem Relation Age of Onset    Hypertension Father     Heart Disease Father     Hypertension Maternal Grandmother     Other Maternal Grandmother         neuropathy    Other Paternal Grandfather         brain aneurysm     Social History     Tobacco Use    Smoking status: Never    Smokeless tobacco: Never   Substance Use Topics    Alcohol use: Yes     Alcohol/week: 0.0 standard drinks     Comment: rare     ALLERGIES:  No Known Allergies       Subjective   Review of Systems   Constitutional:  Negative for activity change, appetite change,unexpected weight change, chills, fever, and fatigue.     HENT: Negative for ear pain, sore throat,  Rhinorrhea, sinus pain,

## 2024-03-09 ASSESSMENT — PATIENT HEALTH QUESTIONNAIRE - PHQ9
SUM OF ALL RESPONSES TO PHQ9 QUESTIONS 1 & 2: 0
SUM OF ALL RESPONSES TO PHQ QUESTIONS 1-9: 0
SUM OF ALL RESPONSES TO PHQ9 QUESTIONS 1 & 2: 0
1. LITTLE INTEREST OR PLEASURE IN DOING THINGS: NOT AT ALL
2. FEELING DOWN, DEPRESSED OR HOPELESS: NOT AT ALL
SUM OF ALL RESPONSES TO PHQ QUESTIONS 1-9: 0
1. LITTLE INTEREST OR PLEASURE IN DOING THINGS: 0
SUM OF ALL RESPONSES TO PHQ QUESTIONS 1-9: 0
2. FEELING DOWN, DEPRESSED OR HOPELESS: 0
SUM OF ALL RESPONSES TO PHQ QUESTIONS 1-9: 0

## 2024-03-11 ENCOUNTER — OFFICE VISIT (OUTPATIENT)
Dept: FAMILY MEDICINE CLINIC | Age: 34
End: 2024-03-11
Payer: COMMERCIAL

## 2024-03-11 VITALS
TEMPERATURE: 97.5 F | BODY MASS INDEX: 33.86 KG/M2 | WEIGHT: 236 LBS | HEART RATE: 85 BPM | OXYGEN SATURATION: 99 % | DIASTOLIC BLOOD PRESSURE: 70 MMHG | SYSTOLIC BLOOD PRESSURE: 124 MMHG

## 2024-03-11 DIAGNOSIS — Z00.00 ENCOUNTER FOR WELL ADULT EXAM WITHOUT ABNORMAL FINDINGS: Primary | ICD-10-CM

## 2024-03-11 PROCEDURE — 99395 PREV VISIT EST AGE 18-39: CPT | Performed by: NURSE PRACTITIONER

## 2024-03-11 PROCEDURE — 3074F SYST BP LT 130 MM HG: CPT | Performed by: NURSE PRACTITIONER

## 2024-03-11 PROCEDURE — 3078F DIAST BP <80 MM HG: CPT | Performed by: NURSE PRACTITIONER

## 2024-03-11 NOTE — PATIENT INSTRUCTIONS
problems.   Take care of your mental health. Try to stay connected with friends, family, and community, and find ways to manage stress.     If you're feeling depressed or hopeless, talk to someone. A counselor can help. If you don't have a counselor, talk to your doctor.   Talk to your doctor if you think you may have a problem with alcohol or drug use. This includes prescription medicines, marijuana, and other drugs.     Avoid tobacco and nicotine: Don't smoke, vape, or chew. If you need help quitting, talk to your doctor.   Practice safer sex. Getting tested, using condoms or dental dams, and limiting sex partners can help prevent STIs.     Use birth control if it's important to you to prevent pregnancy. Talk with your doctor about your choices and what might be best for you.   Prevent problems where you can. Protect your skin from too much sun, wash your hands, brush your teeth twice a day, and wear a seat belt in the car.   Where can you learn more?  Go to https://www.[employer].net/patientEd and enter P072 to learn more about \"Well Visit, Ages 18 to 65: Care Instructions.\"  Current as of: August 6, 2023               Content Version: 14.0  © 7118-0074 [employer].   Care instructions adapted under license by [employer]. If you have questions about a medical condition or this instruction, always ask your healthcare professional. [employer] disclaims any warranty or liability for your use of this information.

## 2025-03-27 ASSESSMENT — PATIENT HEALTH QUESTIONNAIRE - PHQ9
2. FEELING DOWN, DEPRESSED OR HOPELESS: NOT AT ALL
SUM OF ALL RESPONSES TO PHQ QUESTIONS 1-9: 0
1. LITTLE INTEREST OR PLEASURE IN DOING THINGS: NOT AT ALL
SUM OF ALL RESPONSES TO PHQ QUESTIONS 1-9: 0
2. FEELING DOWN, DEPRESSED OR HOPELESS: NOT AT ALL
SUM OF ALL RESPONSES TO PHQ9 QUESTIONS 1 & 2: 0
SUM OF ALL RESPONSES TO PHQ QUESTIONS 1-9: 0
1. LITTLE INTEREST OR PLEASURE IN DOING THINGS: NOT AT ALL
SUM OF ALL RESPONSES TO PHQ QUESTIONS 1-9: 0

## 2025-03-28 ENCOUNTER — OFFICE VISIT (OUTPATIENT)
Dept: FAMILY MEDICINE CLINIC | Age: 35
End: 2025-03-28
Payer: COMMERCIAL

## 2025-03-28 VITALS
BODY MASS INDEX: 33.15 KG/M2 | OXYGEN SATURATION: 98 % | DIASTOLIC BLOOD PRESSURE: 80 MMHG | WEIGHT: 231 LBS | TEMPERATURE: 97.5 F | SYSTOLIC BLOOD PRESSURE: 112 MMHG | HEART RATE: 63 BPM

## 2025-03-28 DIAGNOSIS — Z01.84 IMMUNITY STATUS TESTING: ICD-10-CM

## 2025-03-28 DIAGNOSIS — Z00.00 ENCOUNTER FOR WELL ADULT EXAM WITHOUT ABNORMAL FINDINGS: Primary | ICD-10-CM

## 2025-03-28 PROCEDURE — 3074F SYST BP LT 130 MM HG: CPT | Performed by: NURSE PRACTITIONER

## 2025-03-28 PROCEDURE — 3079F DIAST BP 80-89 MM HG: CPT | Performed by: NURSE PRACTITIONER

## 2025-03-28 PROCEDURE — 99395 PREV VISIT EST AGE 18-39: CPT | Performed by: NURSE PRACTITIONER

## 2025-03-28 SDOH — ECONOMIC STABILITY: FOOD INSECURITY: WITHIN THE PAST 12 MONTHS, THE FOOD YOU BOUGHT JUST DIDN'T LAST AND YOU DIDN'T HAVE MONEY TO GET MORE.: NEVER TRUE

## 2025-03-28 SDOH — ECONOMIC STABILITY: FOOD INSECURITY: WITHIN THE PAST 12 MONTHS, YOU WORRIED THAT YOUR FOOD WOULD RUN OUT BEFORE YOU GOT MONEY TO BUY MORE.: NEVER TRUE

## 2025-03-28 NOTE — PROGRESS NOTES
Well Adult Note  Name: Deng Beebe Today’s Date: 3/28/2025   MRN: [id number] Sex: Male   Age: 35 y.o. Ethnicity: Non- / Non    : 1990 Race: White (non-)      Deng Beebe is here for a well adult exam.          Assessment & Plan  1. Annual physical examination.  - Blood pressure readings are within the normal range.  - Comprehensive blood work panel will be ordered, requiring fasting for 8 hours prior to the test. Hepatitis B immunity status will also be assessed.  - Patient is not yet eligible for the pneumonia vaccine due to age, but it is recommended for future consideration. Asthma condition is currently well-managed.  - Follow-up scheduled for 1 year or earlier if necessary.    Encounter for well adult exam without abnormal findings  -     CBC with Auto Differential; Future  -     Comprehensive Metabolic Panel; Future  -     TSH reflex to FT4; Future  -     Lipid, Fasting; Future  Immunity status testing  -     Hepatitis B Surface Antibody; Future  -     Hepatitis B Surface Antigen; Future  Results         Return in about 1 year (around 3/28/2026) for annual physical.     Subjective   History of Present Illness  The patient is a 35-year-old male who presents for an annual physical.    No changes in medical, surgical, or family history are reported. He has not received the influenza or COVID-19 vaccines. Currently, he is not on any medications, only taking vitamins. There are no known drug allergies. He does not smoke but consumes alcohol socially. No current issues with asthma are reported. His exercise routine is limited due to time constraints, but he attempts to engage in physical activity 3 to 4 days per week. No concerns are reported at this time. He believes he has been vaccinated against hepatitis B, as he has undergone multiple tests for dormitory living. Childhood asthma was intermittent and often triggered by exposure to river water. He continues to see a